# Patient Record
Sex: MALE | Race: OTHER | Employment: FULL TIME | URBAN - METROPOLITAN AREA
[De-identification: names, ages, dates, MRNs, and addresses within clinical notes are randomized per-mention and may not be internally consistent; named-entity substitution may affect disease eponyms.]

---

## 2017-09-22 ENCOUNTER — OFFICE VISIT (OUTPATIENT)
Dept: INTERNAL MEDICINE CLINIC | Age: 37
End: 2017-09-22

## 2017-09-22 VITALS — SYSTOLIC BLOOD PRESSURE: 122 MMHG | DIASTOLIC BLOOD PRESSURE: 86 MMHG | BODY MASS INDEX: 24.37 KG/M2 | WEIGHT: 151 LBS

## 2017-09-22 DIAGNOSIS — Z00.00 ROUTINE MEDICAL EXAM: ICD-10-CM

## 2017-09-22 DIAGNOSIS — Z23 NEEDS FLU SHOT: ICD-10-CM

## 2017-09-22 DIAGNOSIS — Z00.00 ROUTINE MEDICAL EXAM: Primary | ICD-10-CM

## 2017-09-22 DIAGNOSIS — F51.01 PRIMARY INSOMNIA: ICD-10-CM

## 2017-09-22 LAB
ANION GAP SERPL CALCULATED.3IONS-SCNC: 15 MMOL/L (ref 3–16)
BUN BLDV-MCNC: 13 MG/DL (ref 7–20)
CALCIUM SERPL-MCNC: 9.8 MG/DL (ref 8.3–10.6)
CHLORIDE BLD-SCNC: 104 MMOL/L (ref 99–110)
CHOLESTEROL, TOTAL: 172 MG/DL (ref 0–199)
CO2: 24 MMOL/L (ref 21–32)
CREAT SERPL-MCNC: 0.8 MG/DL (ref 0.9–1.3)
GFR AFRICAN AMERICAN: >60
GFR NON-AFRICAN AMERICAN: >60
GLUCOSE BLD-MCNC: 95 MG/DL (ref 70–99)
HCT VFR BLD CALC: 46 % (ref 40.5–52.5)
HDLC SERPL-MCNC: 51 MG/DL (ref 40–60)
HEMOGLOBIN: 15.6 G/DL (ref 13.5–17.5)
LDL CHOLESTEROL CALCULATED: 102 MG/DL
MCH RBC QN AUTO: 31.2 PG (ref 26–34)
MCHC RBC AUTO-ENTMCNC: 33.9 G/DL (ref 31–36)
MCV RBC AUTO: 92.2 FL (ref 80–100)
PDW BLD-RTO: 12.8 % (ref 12.4–15.4)
PLATELET # BLD: 202 K/UL (ref 135–450)
PMV BLD AUTO: 9.6 FL (ref 5–10.5)
POTASSIUM SERPL-SCNC: 4.2 MMOL/L (ref 3.5–5.1)
RBC # BLD: 4.99 M/UL (ref 4.2–5.9)
SODIUM BLD-SCNC: 143 MMOL/L (ref 136–145)
TRIGL SERPL-MCNC: 94 MG/DL (ref 0–150)
VLDLC SERPL CALC-MCNC: 19 MG/DL
WBC # BLD: 4.5 K/UL (ref 4–11)

## 2017-09-22 PROCEDURE — 90686 IIV4 VACC NO PRSV 0.5 ML IM: CPT | Performed by: INTERNAL MEDICINE

## 2017-09-22 PROCEDURE — 90471 IMMUNIZATION ADMIN: CPT | Performed by: INTERNAL MEDICINE

## 2017-09-22 PROCEDURE — 99395 PREV VISIT EST AGE 18-39: CPT | Performed by: INTERNAL MEDICINE

## 2017-09-22 RX ORDER — ASCORBIC ACID 500 MG
500 TABLET ORAL DAILY
COMMUNITY
End: 2018-09-28 | Stop reason: ALTCHOICE

## 2017-09-22 RX ORDER — LANOLIN ALCOHOL/MO/W.PET/CERES
3 CREAM (GRAM) TOPICAL DAILY
Qty: 30 TABLET | Refills: 5 | Status: SHIPPED | OUTPATIENT
Start: 2017-09-22 | End: 2018-09-28 | Stop reason: ALTCHOICE

## 2017-09-22 ASSESSMENT — ENCOUNTER SYMPTOMS
VOMITING: 0
NAUSEA: 0
EYE DISCHARGE: 0
EYE PAIN: 0
COLOR CHANGE: 0
ABDOMINAL PAIN: 0
WHEEZING: 0
COUGH: 0
BACK PAIN: 0
RHINORRHEA: 0
SHORTNESS OF BREATH: 0
VOICE CHANGE: 0

## 2018-09-28 ENCOUNTER — OFFICE VISIT (OUTPATIENT)
Dept: INTERNAL MEDICINE CLINIC | Age: 38
End: 2018-09-28
Payer: COMMERCIAL

## 2018-09-28 VITALS
DIASTOLIC BLOOD PRESSURE: 88 MMHG | BODY MASS INDEX: 23.63 KG/M2 | SYSTOLIC BLOOD PRESSURE: 124 MMHG | HEART RATE: 72 BPM | HEIGHT: 66 IN | WEIGHT: 147 LBS

## 2018-09-28 DIAGNOSIS — Z00.00 ROUTINE PHYSICAL EXAMINATION: Primary | ICD-10-CM

## 2018-09-28 DIAGNOSIS — R22.2 LUMP IN CHEST: ICD-10-CM

## 2018-09-28 DIAGNOSIS — Z00.00 ROUTINE PHYSICAL EXAMINATION: ICD-10-CM

## 2018-09-28 DIAGNOSIS — R17 ELEVATED BILIRUBIN: Primary | ICD-10-CM

## 2018-09-28 LAB
A/G RATIO: 1.6 (ref 1.1–2.2)
ALBUMIN SERPL-MCNC: 4.5 G/DL (ref 3.4–5)
ALP BLD-CCNC: 45 U/L (ref 40–129)
ALT SERPL-CCNC: 32 U/L (ref 10–40)
ANION GAP SERPL CALCULATED.3IONS-SCNC: 14 MMOL/L (ref 3–16)
AST SERPL-CCNC: 34 U/L (ref 15–37)
BILIRUB SERPL-MCNC: 1.9 MG/DL (ref 0–1)
BUN BLDV-MCNC: 10 MG/DL (ref 7–20)
CALCIUM SERPL-MCNC: 9.7 MG/DL (ref 8.3–10.6)
CHLORIDE BLD-SCNC: 101 MMOL/L (ref 99–110)
CHOLESTEROL, TOTAL: 156 MG/DL (ref 0–199)
CO2: 25 MMOL/L (ref 21–32)
CREAT SERPL-MCNC: 1 MG/DL (ref 0.9–1.3)
GFR AFRICAN AMERICAN: >60
GFR NON-AFRICAN AMERICAN: >60
GLOBULIN: 2.8 G/DL
GLUCOSE BLD-MCNC: 89 MG/DL (ref 70–99)
HCT VFR BLD CALC: 45.9 % (ref 40.5–52.5)
HDLC SERPL-MCNC: 56 MG/DL (ref 40–60)
HEMOGLOBIN: 15 G/DL (ref 13.5–17.5)
LDL CHOLESTEROL CALCULATED: 76 MG/DL
MCH RBC QN AUTO: 30.5 PG (ref 26–34)
MCHC RBC AUTO-ENTMCNC: 32.6 G/DL (ref 31–36)
MCV RBC AUTO: 93.4 FL (ref 80–100)
PDW BLD-RTO: 12.8 % (ref 12.4–15.4)
PLATELET # BLD: 215 K/UL (ref 135–450)
PMV BLD AUTO: 9.3 FL (ref 5–10.5)
POTASSIUM SERPL-SCNC: 4.4 MMOL/L (ref 3.5–5.1)
RBC # BLD: 4.91 M/UL (ref 4.2–5.9)
SODIUM BLD-SCNC: 140 MMOL/L (ref 136–145)
TOTAL PROTEIN: 7.3 G/DL (ref 6.4–8.2)
TRIGL SERPL-MCNC: 118 MG/DL (ref 0–150)
TSH REFLEX: 0.86 UIU/ML (ref 0.27–4.2)
VLDLC SERPL CALC-MCNC: 24 MG/DL
WBC # BLD: 4.5 K/UL (ref 4–11)

## 2018-09-28 PROCEDURE — 99395 PREV VISIT EST AGE 18-39: CPT | Performed by: INTERNAL MEDICINE

## 2018-09-28 PROCEDURE — G0444 DEPRESSION SCREEN ANNUAL: HCPCS | Performed by: INTERNAL MEDICINE

## 2018-09-28 ASSESSMENT — ENCOUNTER SYMPTOMS
TROUBLE SWALLOWING: 0
BACK PAIN: 0
WHEEZING: 0
ABDOMINAL PAIN: 0
EYE REDNESS: 0
DIARRHEA: 0
SHORTNESS OF BREATH: 0
VOMITING: 0
COUGH: 0
CONSTIPATION: 0
VOICE CHANGE: 0
NAUSEA: 0
EYE PAIN: 0
COLOR CHANGE: 0
EYE DISCHARGE: 0

## 2018-09-28 ASSESSMENT — PATIENT HEALTH QUESTIONNAIRE - PHQ9
5. POOR APPETITE OR OVEREATING: 0
8. MOVING OR SPEAKING SO SLOWLY THAT OTHER PEOPLE COULD HAVE NOTICED. OR THE OPPOSITE, BEING SO FIGETY OR RESTLESS THAT YOU HAVE BEEN MOVING AROUND A LOT MORE THAN USUAL: 0
9. THOUGHTS THAT YOU WOULD BE BETTER OFF DEAD, OR OF HURTING YOURSELF: 0
SUM OF ALL RESPONSES TO PHQ QUESTIONS 1-9: 0
6. FEELING BAD ABOUT YOURSELF - OR THAT YOU ARE A FAILURE OR HAVE LET YOURSELF OR YOUR FAMILY DOWN: 0
SUM OF ALL RESPONSES TO PHQ QUESTIONS 1-9: 0
2. FEELING DOWN, DEPRESSED OR HOPELESS: 0
SUM OF ALL RESPONSES TO PHQ9 QUESTIONS 1 & 2: 0
3. TROUBLE FALLING OR STAYING ASLEEP: 0
7. TROUBLE CONCENTRATING ON THINGS, SUCH AS READING THE NEWSPAPER OR WATCHING TELEVISION: 0
4. FEELING TIRED OR HAVING LITTLE ENERGY: 0
1. LITTLE INTEREST OR PLEASURE IN DOING THINGS: 0
10. IF YOU CHECKED OFF ANY PROBLEMS, HOW DIFFICULT HAVE THESE PROBLEMS MADE IT FOR YOU TO DO YOUR WORK, TAKE CARE OF THINGS AT HOME, OR GET ALONG WITH OTHER PEOPLE: 0

## 2018-09-28 NOTE — PATIENT INSTRUCTIONS
condoms. For men  · Tests for sexually transmitted infections (STIs). Ask whether you should have tests for STIs. You may be at risk if you have sex with more than one person, especially if you do not wear a condom. · Testicular cancer exam. Ask your doctor whether you should check your testicles regularly. · Prostate exam. Talk to your doctor about whether you should have a blood test (called a PSA test) for prostate cancer. Experts differ on whether and when men should have this test. Some experts suggest it if you are older than 39 and are -American or have a father or brother who got prostate cancer when he was younger than 72. When should you call for help? Watch closely for changes in your health, and be sure to contact your doctor if you have any problems or symptoms that concern you. Where can you learn more? Go to [email or web address] and sign in to your [employer] account. Enter P072 in the Offermobi box to learn more about \"Well Visit, Ages 25 to 48: Care Instructions. \"     If you do not have an account, please click on the \"Sign Up Now\" link. Current as of: May 16, 2017  Content Version: 11.7  © 3514-6666 [employer]. Care instructions adapted under license by [facility] ([facility]). If you have questions about a medical condition or this instruction, always ask your healthcare professional. [address]  any warranty or liability for your use of this information.

## 2018-09-28 NOTE — PROGRESS NOTES
Status:   Future     Standing Expiration Date:   9/28/2019    Ambulatory referral to General Surgery     Referral Priority:   Routine     Referral Type:   Consult for Advice and Opinion     Referral Reason:   Specialty Services Required     Referred to Provider:   Lynda Bear MD     Requested Specialty:   General Surgery     Number of Visits Requested:   1     No current outpatient prescriptions on file. No current facility-administered medications for this visit. Return in about 1 year (around 9/28/2019) for physical.  An After Visit Summary was printed and given to the patient. Documentation was done using voice recognition dragon software. Every effort was made to ensure accuracy; however, inadvertent  Unintentional computerized transcription errors may be present.

## 2018-10-05 ENCOUNTER — OFFICE VISIT (OUTPATIENT)
Dept: SURGERY | Age: 38
End: 2018-10-05
Payer: COMMERCIAL

## 2018-10-05 VITALS
WEIGHT: 146 LBS | DIASTOLIC BLOOD PRESSURE: 80 MMHG | HEIGHT: 66 IN | SYSTOLIC BLOOD PRESSURE: 110 MMHG | BODY MASS INDEX: 23.46 KG/M2

## 2018-10-05 DIAGNOSIS — Z00.00 ROUTINE PHYSICAL EXAMINATION: ICD-10-CM

## 2018-10-05 DIAGNOSIS — D17.1 LIPOMA OF TORSO: Primary | ICD-10-CM

## 2018-10-05 DIAGNOSIS — R17 ELEVATED BILIRUBIN: ICD-10-CM

## 2018-10-05 LAB
ALBUMIN SERPL-MCNC: 4.8 G/DL (ref 3.4–5)
ALP BLD-CCNC: 60 U/L (ref 40–129)
ALT SERPL-CCNC: 28 U/L (ref 10–40)
AST SERPL-CCNC: 26 U/L (ref 15–37)
BILIRUB SERPL-MCNC: 0.8 MG/DL (ref 0–1)
BILIRUBIN DIRECT: <0.2 MG/DL (ref 0–0.3)
BILIRUBIN URINE: NEGATIVE
BILIRUBIN, INDIRECT: NORMAL MG/DL (ref 0–1)
BLOOD, URINE: NEGATIVE
CLARITY: CLEAR
COLOR: YELLOW
GLUCOSE URINE: NEGATIVE MG/DL
KETONES, URINE: NEGATIVE MG/DL
LEUKOCYTE ESTERASE, URINE: NEGATIVE
MICROSCOPIC EXAMINATION: NORMAL
NITRITE, URINE: NEGATIVE
PH UA: 7
PROTEIN UA: NEGATIVE MG/DL
SPECIFIC GRAVITY UA: 1
TOTAL PROTEIN: 7.3 G/DL (ref 6.4–8.2)
URINE TYPE: NORMAL
UROBILINOGEN, URINE: 0.2 E.U./DL

## 2018-10-05 PROCEDURE — 99203 OFFICE O/P NEW LOW 30 MIN: CPT | Performed by: SURGERY

## 2018-10-05 ASSESSMENT — ENCOUNTER SYMPTOMS
ABDOMINAL DISTENTION: 0
EYE ITCHING: 0
ABDOMINAL PAIN: 0
COLOR CHANGE: 0
BACK PAIN: 1
SINUS PRESSURE: 1
EYE DISCHARGE: 0
APNEA: 0
CHEST TIGHTNESS: 0

## 2018-10-05 NOTE — PROGRESS NOTES
[facility]  SUBJECTIVE:    Chief Complaint: Left chest soft tissue mass    Eddi Selby   1980   41 y.o. male is referred by his primary care physician Dr. Sanchez Parents with a left chest soft tissue mass is likely a lipoma. It is been present for at least several weeks and first noticed after his flu shot. It is not painful and other than its presence is not causing any symptoms. He has no other similar masses now or in the past.  He has no significant medical conditions and has never had any surgery in the chest or neck or to remove any other skin or soft tissue masses. He does not smoke    Review of Systems   Constitutional: Positive for appetite change. Negative for activity change. HENT: Positive for sinus pressure. Negative for drooling. Eyes: Negative for discharge and itching. Respiratory: Negative for apnea and chest tightness. Cardiovascular: Negative for chest pain and leg swelling. Gastrointestinal: Negative for abdominal distention and abdominal pain. Endocrine: Positive for heat intolerance. Negative for cold intolerance. Genitourinary: Negative for difficulty urinating and dysuria. Musculoskeletal: Positive for back pain. Negative for arthralgias. Skin: Negative for color change and pallor. Allergic/Immunologic: Negative for environmental allergies and food allergies. Neurological: Positive for light-headedness and headaches. Hematological: Negative for adenopathy. Does not bruise/bleed easily. Psychiatric/Behavioral: Negative for agitation and behavioral problems. History reviewed. No pertinent past medical history. History reviewed. No pertinent surgical history. Social History     Social History    Marital status:      Spouse name: N/A    Number of children: 1    Years of education: N/A     Occupational History          [employer], 12 hr days.       Social History Main Topics    Smoking status: Never Smoker    Smokeless tobacco: Never Used    Alcohol use Yes      Comment: moderate    Drug use: No    Sexual activity: Yes     Other Topics Concern    Not on file     Social History Narrative    No narrative on file      Family History   Problem Relation Age of Onset    Cancer Father         type unknown     No current outpatient prescriptions on file. No current facility-administered medications for this visit. No Known Allergies     OBJECTIVE:  Ht 5' 6\" (1.676 m)   BMI 23.73 kg/m²    Physical Exam   Constitutional: He is oriented to person, place, and time. He appears well-developed and well-nourished. No distress. HENT:   Head: Normocephalic and atraumatic. Eyes: Pupils are equal, round, and reactive to light. Conjunctivae and EOM are normal.   Neck: No tracheal deviation present. No thyromegaly present. Cardiovascular: Normal rate, regular rhythm and normal heart sounds. Exam reveals no gallop and no friction rub. No murmur heard. Pulmonary/Chest: Effort normal and breath sounds normal. No respiratory distress. He has no wheezes. Lymphadenopathy:     He has no cervical adenopathy. Neurological: He is alert and oriented to person, place, and time. Skin: Skin is warm and dry. No rash noted. He is not diaphoretic. No erythema. Psychiatric: He has a normal mood and affect.  His behavior is normal. Judgment and thought content normal.        Labs:  Orders Only on 09/28/2018   Component Date Value Ref Range Status    WBC 09/28/2018 4.5  4.0 - 11.0 K/uL Final    RBC 09/28/2018 4.91  4.20 - 5.90 M/uL Final    Hemoglobin 09/28/2018 15.0  13.5 - 17.5 g/dL Final    Hematocrit 09/28/2018 45.9  40.5 - 52.5 % Final    MCV 09/28/2018 93.4  80.0 - 100.0 fL Final    MCH 09/28/2018 30.5  26.0 - 34.0 pg Final    MCHC 09/28/2018 32.6  31.0 - 36.0 g/dL Final    RDW 09/28/2018 12.8  12.4 - 15.4 % Final    Platelets 62/17/3804 215  135 - 450 K/uL Final    MPV 09/28/2018

## 2018-10-05 NOTE — PATIENT INSTRUCTIONS
1. We discussed the risks of surgery including bleeding, infection, as well as the cost and pain related to the procedure. Benefits of excision include resolution of symptoms and definitive tissue diagnosis. Alternatives include close observation. The patient understands, agrees, and wishes to proceed with excision  2. We also discussed anesthesia options including general anesthesia, local anesthetic only, and local anesthetic with sedation. General anesthesia provides the most patient relaxation but at the cost of higher risk as it includes endotracheal intubation. Local anesthetic poses the least risk to the patient but is the most uncomfortable. Additional benefit of local anesthetic only is that the patient could drive home from the procedure. The patient is still deciding between local anesthetic only as an office procedure or local anesthetic with sedation performed in the operating room. He will contact the office after discussion with his insurance company has cost is a factor in his decision making. When ready to decide, he will contact the office and we will schedule the appropriate procedure.  When ready, will schedule excision of soft tissue mass on left chest with anesthesia type and location to be determined

## 2019-03-27 ENCOUNTER — OFFICE VISIT (OUTPATIENT)
Dept: INTERNAL MEDICINE CLINIC | Age: 39
End: 2019-03-27
Payer: COMMERCIAL

## 2019-03-27 VITALS
HEART RATE: 132 BPM | TEMPERATURE: 101.1 F | DIASTOLIC BLOOD PRESSURE: 80 MMHG | BODY MASS INDEX: 23.57 KG/M2 | SYSTOLIC BLOOD PRESSURE: 132 MMHG | WEIGHT: 146 LBS

## 2019-03-27 DIAGNOSIS — R68.89 FLU-LIKE SYMPTOMS: ICD-10-CM

## 2019-03-27 DIAGNOSIS — J18.9 PNEUMONIA DUE TO INFECTIOUS ORGANISM, UNSPECIFIED LATERALITY, UNSPECIFIED PART OF LUNG: Primary | ICD-10-CM

## 2019-03-27 DIAGNOSIS — J18.9 PNEUMONIA DUE TO INFECTIOUS ORGANISM, UNSPECIFIED LATERALITY, UNSPECIFIED PART OF LUNG: ICD-10-CM

## 2019-03-27 DIAGNOSIS — R50.9 FEVER, UNSPECIFIED FEVER CAUSE: ICD-10-CM

## 2019-03-27 LAB
BASOPHILS ABSOLUTE: 0.2 K/UL (ref 0–0.2)
BASOPHILS RELATIVE PERCENT: 1.9 %
BILIRUBIN URINE: NEGATIVE
BLOOD, URINE: NEGATIVE
CLARITY: CLEAR
COLOR: YELLOW
EOSINOPHILS ABSOLUTE: 0.4 K/UL (ref 0–0.6)
EOSINOPHILS RELATIVE PERCENT: 3.6 %
GLUCOSE URINE: NEGATIVE MG/DL
HCT VFR BLD CALC: 45.4 % (ref 40.5–52.5)
HEMOGLOBIN: 15.4 G/DL (ref 13.5–17.5)
INFLUENZA A ANTIBODY: NORMAL
INFLUENZA B ANTIBODY: NORMAL
KETONES, URINE: NEGATIVE MG/DL
LEUKOCYTE ESTERASE, URINE: NEGATIVE
LYMPHOCYTES ABSOLUTE: 0.9 K/UL (ref 1–5.1)
LYMPHOCYTES RELATIVE PERCENT: 8.2 %
MCH RBC QN AUTO: 30.8 PG (ref 26–34)
MCHC RBC AUTO-ENTMCNC: 33.8 G/DL (ref 31–36)
MCV RBC AUTO: 91.1 FL (ref 80–100)
MICROSCOPIC EXAMINATION: NORMAL
MONOCYTES ABSOLUTE: 0.6 K/UL (ref 0–1.3)
MONOCYTES RELATIVE PERCENT: 5.8 %
NEUTROPHILS ABSOLUTE: 8.7 K/UL (ref 1.7–7.7)
NEUTROPHILS RELATIVE PERCENT: 80.5 %
NITRITE, URINE: NEGATIVE
PDW BLD-RTO: 12.7 % (ref 12.4–15.4)
PH UA: 6.5 (ref 5–8)
PLATELET # BLD: 212 K/UL (ref 135–450)
PMV BLD AUTO: 9 FL (ref 5–10.5)
PROTEIN UA: NEGATIVE MG/DL
RBC # BLD: 4.98 M/UL (ref 4.2–5.9)
SPECIFIC GRAVITY UA: 1.02 (ref 1–1.03)
URINE TYPE: NORMAL
UROBILINOGEN, URINE: 0.2 E.U./DL
WBC # BLD: 10.8 K/UL (ref 4–11)

## 2019-03-27 PROCEDURE — 87804 INFLUENZA ASSAY W/OPTIC: CPT | Performed by: INTERNAL MEDICINE

## 2019-03-27 PROCEDURE — 99214 OFFICE O/P EST MOD 30 MIN: CPT | Performed by: INTERNAL MEDICINE

## 2019-03-27 RX ORDER — LEVOFLOXACIN 500 MG/1
500 TABLET, FILM COATED ORAL DAILY
Qty: 10 TABLET | Refills: 0 | Status: SHIPPED | OUTPATIENT
Start: 2019-03-27 | End: 2019-04-06

## 2019-03-27 RX ORDER — ACETAMINOPHEN 500 MG
1000 TABLET ORAL ONCE
Status: COMPLETED | OUTPATIENT
Start: 2019-03-27 | End: 2019-03-27

## 2019-03-27 RX ORDER — OSELTAMIVIR PHOSPHATE 75 MG/1
75 CAPSULE ORAL 2 TIMES DAILY
Qty: 10 CAPSULE | Refills: 0 | Status: SHIPPED | OUTPATIENT
Start: 2019-03-27 | End: 2019-04-01

## 2019-03-27 RX ORDER — ALBUTEROL SULFATE 90 UG/1
2 AEROSOL, METERED RESPIRATORY (INHALATION) 4 TIMES DAILY PRN
Qty: 3 INHALER | Refills: 1 | Status: SHIPPED | OUTPATIENT
Start: 2019-03-27 | End: 2020-03-06 | Stop reason: ALTCHOICE

## 2019-03-27 RX ADMIN — Medication 1000 MG: at 15:09

## 2019-03-27 ASSESSMENT — ENCOUNTER SYMPTOMS
NAUSEA: 0
RHINORRHEA: 0
TROUBLE SWALLOWING: 0
SHORTNESS OF BREATH: 0
SORE THROAT: 1
COUGH: 1
WHEEZING: 1
PHOTOPHOBIA: 0
VOMITING: 0
VOICE CHANGE: 0
DIARRHEA: 0
ABDOMINAL PAIN: 0

## 2019-03-28 ENCOUNTER — HOSPITAL ENCOUNTER (OUTPATIENT)
Age: 39
Discharge: HOME | End: 2019-03-28
Payer: COMMERCIAL

## 2019-03-28 ENCOUNTER — HOSPITAL ENCOUNTER (OUTPATIENT)
Dept: GENERAL RADIOLOGY | Age: 39
Discharge: HOME | End: 2019-03-28
Payer: COMMERCIAL

## 2019-03-28 DIAGNOSIS — J18.9 PNEUMONIA DUE TO INFECTIOUS ORGANISM, UNSPECIFIED LATERALITY, UNSPECIFIED PART OF LUNG: ICD-10-CM

## 2019-03-28 PROCEDURE — 71046 X-RAY EXAM CHEST 2 VIEWS: CPT

## 2019-04-01 ENCOUNTER — OFFICE VISIT (OUTPATIENT)
Dept: INTERNAL MEDICINE CLINIC | Age: 39
End: 2019-04-01
Payer: COMMERCIAL

## 2019-04-01 VITALS
HEIGHT: 66 IN | HEART RATE: 72 BPM | WEIGHT: 149 LBS | BODY MASS INDEX: 23.95 KG/M2 | DIASTOLIC BLOOD PRESSURE: 88 MMHG | SYSTOLIC BLOOD PRESSURE: 120 MMHG

## 2019-04-01 DIAGNOSIS — R68.89 FLU-LIKE SYMPTOMS: ICD-10-CM

## 2019-04-01 DIAGNOSIS — R50.9 FEVER, UNSPECIFIED FEVER CAUSE: Primary | ICD-10-CM

## 2019-04-01 PROCEDURE — 99212 OFFICE O/P EST SF 10 MIN: CPT | Performed by: INTERNAL MEDICINE

## 2019-04-01 ASSESSMENT — PATIENT HEALTH QUESTIONNAIRE - PHQ9
SUM OF ALL RESPONSES TO PHQ QUESTIONS 1-9: 0
1. LITTLE INTEREST OR PLEASURE IN DOING THINGS: 0
SUM OF ALL RESPONSES TO PHQ QUESTIONS 1-9: 0
SUM OF ALL RESPONSES TO PHQ9 QUESTIONS 1 & 2: 0
2. FEELING DOWN, DEPRESSED OR HOPELESS: 0

## 2019-04-01 ASSESSMENT — ENCOUNTER SYMPTOMS
WHEEZING: 0
COUGH: 0
ABDOMINAL PAIN: 0
SHORTNESS OF BREATH: 0
NAUSEA: 0

## 2019-04-01 NOTE — PROGRESS NOTES
chest pain and palpitations. Gastrointestinal: Negative for abdominal pain and nausea. Neurological: Negative for dizziness and light-headedness. OBJECTIVE:  Pulse Readings from Last 4 Encounters:   04/01/19 72   03/27/19 132   09/28/18 72   12/14/16 80     Wt Readings from Last 4 Encounters:   04/01/19 149 lb (67.6 kg)   03/27/19 146 lb (66.2 kg)   10/05/18 146 lb (66.2 kg)   09/28/18 147 lb (66.7 kg)     BP Readings from Last 4 Encounters:   04/01/19 120/88   03/27/19 132/80   10/05/18 110/80   09/28/18 124/88     Physical Exam   HENT:   Mouth/Throat: No oropharyngeal exudate. Eyes: EOM are normal.   Cardiovascular: Normal rate, regular rhythm and normal heart sounds. Pulmonary/Chest: Effort normal and breath sounds normal. No respiratory distress. He has no wheezes. He has no rales. Abdominal: Soft. Bowel sounds are normal.   Nursing note and vitals reviewed.       CBC:   Lab Results   Component Value Date    WBC 10.8 03/27/2019    HGB 15.4 03/27/2019    HCT 45.4 03/27/2019     03/27/2019     CMP:  Lab Results   Component Value Date     09/28/2018    K 4.4 09/28/2018     09/28/2018    CO2 25 09/28/2018    ANIONGAP 14 09/28/2018    GLUCOSE 89 09/28/2018    BUN 10 09/28/2018    CREATININE 1.0 09/28/2018    GFRAA >60 09/28/2018    CALCIUM 9.7 09/28/2018    PROT 7.3 10/05/2018    LABALBU 4.8 10/05/2018    AGRATIO 1.6 09/28/2018    BILITOT 0.8 10/05/2018    ALKPHOS 60 10/05/2018    ALT 28 10/05/2018    AST 26 10/05/2018    GLOB 2.8 09/28/2018     URINALYSIS:  Lab Results   Component Value Date    GLUCOSEU Negative 03/27/2019    KETUA Negative 03/27/2019    SPECGRAV 1.019 03/27/2019    BLOODU Negative 03/27/2019    PHUR 6.5 03/27/2019    PROTEINU Negative 03/27/2019    NITRU Negative 03/27/2019    LEUKOCYTESUR Negative 03/27/2019    LABMICR Not Indicated 03/27/2019    URINETYPE Clean catch 03/27/2019     MICRO/ALB:   Lab Results   Component Value Date    LABMICR Not Indicated

## 2019-11-15 ENCOUNTER — OFFICE VISIT (OUTPATIENT)
Dept: INTERNAL MEDICINE CLINIC | Age: 39
End: 2019-11-15
Payer: COMMERCIAL

## 2019-11-15 VITALS
BODY MASS INDEX: 24.27 KG/M2 | WEIGHT: 151 LBS | SYSTOLIC BLOOD PRESSURE: 124 MMHG | HEIGHT: 66 IN | HEART RATE: 72 BPM | DIASTOLIC BLOOD PRESSURE: 86 MMHG

## 2019-11-15 DIAGNOSIS — D17.1 LIPOMA OF TORSO: ICD-10-CM

## 2019-11-15 DIAGNOSIS — Z00.00 ROUTINE PHYSICAL EXAMINATION: ICD-10-CM

## 2019-11-15 DIAGNOSIS — Z00.00 ROUTINE PHYSICAL EXAMINATION: Primary | ICD-10-CM

## 2019-11-15 LAB
A/G RATIO: 2.9 (ref 1.1–2.2)
ALBUMIN SERPL-MCNC: 5.5 G/DL (ref 3.4–5)
ALP BLD-CCNC: 45 U/L (ref 40–129)
ALT SERPL-CCNC: 29 U/L (ref 10–40)
ANION GAP SERPL CALCULATED.3IONS-SCNC: 15 MMOL/L (ref 3–16)
AST SERPL-CCNC: 28 U/L (ref 15–37)
BILIRUB SERPL-MCNC: 1.3 MG/DL (ref 0–1)
BILIRUBIN URINE: NEGATIVE
BLOOD, URINE: NEGATIVE
BUN BLDV-MCNC: 15 MG/DL (ref 7–20)
CALCIUM SERPL-MCNC: 9.3 MG/DL (ref 8.3–10.6)
CHLORIDE BLD-SCNC: 103 MMOL/L (ref 99–110)
CHOLESTEROL, TOTAL: 198 MG/DL (ref 0–199)
CLARITY: CLEAR
CO2: 22 MMOL/L (ref 21–32)
COLOR: YELLOW
CREAT SERPL-MCNC: 1 MG/DL (ref 0.9–1.3)
GFR AFRICAN AMERICAN: >60
GFR NON-AFRICAN AMERICAN: >60
GLOBULIN: 1.9 G/DL
GLUCOSE BLD-MCNC: 86 MG/DL (ref 70–99)
GLUCOSE URINE: NEGATIVE MG/DL
HCT VFR BLD CALC: 44.4 % (ref 40.5–52.5)
HDLC SERPL-MCNC: 60 MG/DL (ref 40–60)
HEMOGLOBIN: 14.9 G/DL (ref 13.5–17.5)
KETONES, URINE: NEGATIVE MG/DL
LDL CHOLESTEROL CALCULATED: 110 MG/DL
LEUKOCYTE ESTERASE, URINE: NEGATIVE
MCH RBC QN AUTO: 31.3 PG (ref 26–34)
MCHC RBC AUTO-ENTMCNC: 33.5 G/DL (ref 31–36)
MCV RBC AUTO: 93.4 FL (ref 80–100)
MICROSCOPIC EXAMINATION: NORMAL
NITRITE, URINE: NEGATIVE
PDW BLD-RTO: 12.9 % (ref 12.4–15.4)
PH UA: 6 (ref 5–8)
PLATELET # BLD: 214 K/UL (ref 135–450)
PMV BLD AUTO: 9.2 FL (ref 5–10.5)
POTASSIUM SERPL-SCNC: 3.9 MMOL/L (ref 3.5–5.1)
PROTEIN UA: NEGATIVE MG/DL
RBC # BLD: 4.75 M/UL (ref 4.2–5.9)
SODIUM BLD-SCNC: 140 MMOL/L (ref 136–145)
SPECIFIC GRAVITY UA: 1.01 (ref 1–1.03)
TOTAL PROTEIN: 7.4 G/DL (ref 6.4–8.2)
TRIGL SERPL-MCNC: 139 MG/DL (ref 0–150)
TSH REFLEX: 0.82 UIU/ML (ref 0.27–4.2)
URINE TYPE: NORMAL
UROBILINOGEN, URINE: 0.2 E.U./DL
VLDLC SERPL CALC-MCNC: 28 MG/DL
WBC # BLD: 5.5 K/UL (ref 4–11)

## 2019-11-15 PROCEDURE — 99395 PREV VISIT EST AGE 18-39: CPT | Performed by: INTERNAL MEDICINE

## 2019-11-15 SDOH — HEALTH STABILITY: MENTAL HEALTH: HOW MANY STANDARD DRINKS CONTAINING ALCOHOL DO YOU HAVE ON A TYPICAL DAY?: 1 OR 2

## 2019-11-15 SDOH — HEALTH STABILITY: MENTAL HEALTH: HOW OFTEN DO YOU HAVE A DRINK CONTAINING ALCOHOL?: 2-4 TIMES A MONTH

## 2019-11-15 ASSESSMENT — PATIENT HEALTH QUESTIONNAIRE - PHQ9
SUM OF ALL RESPONSES TO PHQ QUESTIONS 1-9: 0
1. LITTLE INTEREST OR PLEASURE IN DOING THINGS: 0
2. FEELING DOWN, DEPRESSED OR HOPELESS: 0
SUM OF ALL RESPONSES TO PHQ QUESTIONS 1-9: 0
SUM OF ALL RESPONSES TO PHQ9 QUESTIONS 1 & 2: 0

## 2019-11-15 ASSESSMENT — ENCOUNTER SYMPTOMS
EYE DISCHARGE: 0
WHEEZING: 0
TROUBLE SWALLOWING: 0
VOMITING: 0
COUGH: 0
ABDOMINAL PAIN: 0
BACK PAIN: 0
COLOR CHANGE: 0
VOICE CHANGE: 0
EYE PAIN: 0
SHORTNESS OF BREATH: 0
NAUSEA: 0

## 2019-11-16 LAB
ESTIMATED AVERAGE GLUCOSE: 96.8 MG/DL
HBA1C MFR BLD: 5 %

## 2020-03-06 ENCOUNTER — OFFICE VISIT (OUTPATIENT)
Dept: INTERNAL MEDICINE CLINIC | Age: 40
End: 2020-03-06
Payer: COMMERCIAL

## 2020-03-06 VITALS
HEART RATE: 68 BPM | SYSTOLIC BLOOD PRESSURE: 136 MMHG | WEIGHT: 147 LBS | DIASTOLIC BLOOD PRESSURE: 96 MMHG | BODY MASS INDEX: 23.73 KG/M2

## 2020-03-06 PROCEDURE — 99213 OFFICE O/P EST LOW 20 MIN: CPT | Performed by: INTERNAL MEDICINE

## 2020-03-06 RX ORDER — IBUPROFEN 600 MG/1
600 TABLET ORAL 3 TIMES DAILY PRN
Qty: 30 TABLET | Refills: 0 | Status: SHIPPED | OUTPATIENT
Start: 2020-03-06 | End: 2022-03-04

## 2020-03-06 RX ORDER — TIZANIDINE 2 MG/1
2 TABLET ORAL 3 TIMES DAILY PRN
Qty: 30 TABLET | Refills: 0 | Status: SHIPPED | OUTPATIENT
Start: 2020-03-06 | End: 2022-03-04 | Stop reason: SDUPTHER

## 2020-03-06 ASSESSMENT — ENCOUNTER SYMPTOMS
TROUBLE SWALLOWING: 0
VOICE CHANGE: 0
VOMITING: 0
NAUSEA: 0
BACK PAIN: 1
ABDOMINAL PAIN: 0
PHOTOPHOBIA: 0
BOWEL INCONTINENCE: 0

## 2020-03-06 ASSESSMENT — PATIENT HEALTH QUESTIONNAIRE - PHQ9
2. FEELING DOWN, DEPRESSED OR HOPELESS: 0
SUM OF ALL RESPONSES TO PHQ9 QUESTIONS 1 & 2: 0
SUM OF ALL RESPONSES TO PHQ QUESTIONS 1-9: 0
1. LITTLE INTEREST OR PLEASURE IN DOING THINGS: 0
SUM OF ALL RESPONSES TO PHQ QUESTIONS 1-9: 0

## 2020-03-06 NOTE — LETTER
[facility]  [address] [address]  Phone: [phone]  Fax: [phone]    Albaro Paredes MD        March 6, 2020     Patient: Keysha Díaz   YOB: 1980   Date of Visit: 3/6/2020       To Whom it May Concern:    Keysha Díaz was seen in my clinic on 3/6/2020. He may return to work on in a week after further evaluation on 03/13/2020. NO lifting, pushing, pulling, carring more than 5 lbs. .    If you have any questions or concerns, please don't hesitate to call.     Sincerely,         Albaro Paredes MD

## 2020-03-06 NOTE — LETTER
[facility]  [address] [address]  Phone: [phone]  Fax: [phone]    Rick Borden MD        March 6, 2020     Patient: Michael Quezada   YOB: 1980   Date of Visit: 3/6/2020       To Whom it May Concern:    Michael Quezada was seen in my clinic on 3/6/2020. He may return to work on until further evaluation in week. No lifting, pushing, pulling, carrying more than 5 lbs. If you have any questions or concerns, please don't hesitate to call.     Sincerely,         Rick Borden MD

## 2020-03-06 NOTE — PROGRESS NOTES
Cyn Price  1980  male  46 y.o. SUBJECTIVE:       Chief Complaint   Patient presents with    Back Pain      sharp back pain X 4 days        HPI:  Back Pain   This is a new problem. Episode onset: 4 days ago started following lifting a 5 gallon bucket of water at work. The problem occurs constantly. The problem has been waxing and waning since onset. The pain is present in the lumbar spine. The pain does not radiate. The pain is at a severity of 6/10. The symptoms are aggravated by bending and twisting. Pertinent negatives include no abdominal pain, bowel incontinence, chest pain, headaches, numbness, paresis, paresthesias, perianal numbness, tingling or weakness. He has tried NSAIDs for the symptoms. The treatment provided mild relief. Patient reports he informed his supervisor about the injury. He continues to suffer from pain. He requires frequent bending pushing pulling lifting which is causing more pain. History of elevated bilirubin in the past.  He is requesting repeat hepatic function again. No past medical history on file. No past surgical history on file. Social History     Socioeconomic History    Marital status:      Spouse name: Not on file    Number of children: 1    Years of education: Not on file    Highest education level: Not on file   Occupational History    Occupation: [employer]     Comment: [employer], 12 hr days.     Social Needs    Financial resource strain: Not on file    Food insecurity:     Worry: Not on file     Inability: Not on file    Transportation needs:     Medical: Not on file     Non-medical: Not on file   Tobacco Use    Smoking status: Never Smoker    Smokeless tobacco: Never Used   Substance and Sexual Activity    Alcohol use: Yes     Frequency: 2-4 times a month     Drinks per session: 1 or 2     Binge frequency: Never     Comment: moderate    Drug use: No    Sexual activity: Yes   Lifestyle    Physical activity:     Days per week: Not on file     Minutes per session: Not on file    Stress: Not on file   Relationships    Social connections:     Talks on phone: Not on file     Gets together: Not on file     Attends Holiness service: Not on file     Active member of club or organization: Not on file     Attends meetings of clubs or organizations: Not on file     Relationship status: Not on file    Intimate partner violence:     Fear of current or ex partner: Not on file     Emotionally abused: Not on file     Physically abused: Not on file     Forced sexual activity: Not on file   Other Topics Concern    Not on file   Social History Narrative    Not on file     Family History   Problem Relation Age of Onset    Cancer Father         type unknown       Review of Systems   Constitutional: Negative for diaphoresis and unexpected weight change. HENT: Negative for trouble swallowing and voice change. Eyes: Negative for photophobia and visual disturbance. Cardiovascular: Negative for chest pain and palpitations. Gastrointestinal: Negative for abdominal pain, bowel incontinence, nausea and vomiting. Musculoskeletal: Positive for back pain. Neurological: Negative for tingling, weakness, numbness, headaches and paresthesias. OBJECTIVE:  Pulse Readings from Last 4 Encounters:   03/06/20 68   11/15/19 72   04/01/19 72   03/27/19 132     Wt Readings from Last 4 Encounters:   03/06/20 147 lb (66.7 kg)   11/15/19 151 lb (68.5 kg)   04/01/19 149 lb (67.6 kg)   03/27/19 146 lb (66.2 kg)     BP Readings from Last 4 Encounters:   03/06/20 (!) 136/96   11/15/19 124/86   04/01/19 120/88   03/27/19 132/80     Physical Exam  Vitals signs and nursing note reviewed. Eyes:      General: No scleral icterus. Conjunctiva/sclera: Conjunctivae normal.   Cardiovascular:      Rate and Rhythm: Normal rate and regular rhythm. Pulses: Normal pulses. Heart sounds: Normal heart sounds.    Pulmonary:      Effort: Pulmonary effort is normal.      Breath sounds: Normal breath sounds. Abdominal:      General: Bowel sounds are normal.      Palpations: Abdomen is soft. Musculoskeletal:      Right lower leg: No edema. Left lower leg: No edema. Comments: Increased muscle spasm and tightness in the lumbar paraspinal area on the right side. No point midline spine tenderness. Negative straight leg raising test.  Increased pain on forward flexion attempt as well as bending and twisting. CBC:   Lab Results   Component Value Date    WBC 5.5 11/15/2019    HGB 14.9 11/15/2019    HCT 44.4 11/15/2019     11/15/2019     CMP:  Lab Results   Component Value Date     11/15/2019    K 3.9 11/15/2019     11/15/2019    CO2 22 11/15/2019    ANIONGAP 15 11/15/2019    GLUCOSE 86 11/15/2019    BUN 15 11/15/2019    CREATININE 1.0 11/15/2019    GFRAA >60 11/15/2019    CALCIUM 9.3 11/15/2019    PROT 7.4 11/15/2019    LABALBU 5.5 11/15/2019    AGRATIO 2.9 11/15/2019    BILITOT 1.3 11/15/2019    ALKPHOS 45 11/15/2019    ALT 29 11/15/2019    AST 28 11/15/2019    GLOB 1.9 11/15/2019     URINALYSIS:  Lab Results   Component Value Date    GLUCOSEU Negative 11/15/2019    KETUA Negative 11/15/2019    SPECGRAV 1.015 11/15/2019    BLOODU Negative 11/15/2019    PHUR 6.0 11/15/2019    PROTEINU Negative 11/15/2019    NITRU Negative 11/15/2019    LEUKOCYTESUR Negative 11/15/2019    LABMICR Not Indicated 11/15/2019    URINETYPE Cleancatch 11/15/2019     HBA1C:   Lab Results   Component Value Date    LABA1C 5.0 11/15/2019    EAG 96.8 11/15/2019     MICRO/ALB:   Lab Results   Component Value Date    LABMICR Not Indicated 11/15/2019     LIPID:  Lab Results   Component Value Date    CHOL 198 11/15/2019    TRIG 139 11/15/2019    HDL 60 11/15/2019    LDLCALC 110 11/15/2019    LABVLDL 28 11/15/2019     TSH:   Lab Results   Component Value Date    TSHREFLEX 0.82 11/15/2019         ASSESSMENT/PLAN:    Dawn Case was seen today for back pain.     Diagnoses and all orders for this visit:    Back strain, initial encounter  -     ibuprofen (ADVIL;MOTRIN) 600 MG tablet; Take 1 tablet by mouth 3 times daily as needed for Pain  -     tiZANidine (ZANAFLEX) 2 MG tablet; Take 1 tablet by mouth 3 times daily as needed (muscle spasm)  Discussed use, benefit, and side effects of prescribed medications. Pt voiced understanding. Advise to call me if there is any concerning symptoms. He is given a work letter. Elevated bilirubin-patient is requesting to have repeat liver enzyme. He is advised to do liver function test after hydration.  -     HEPATIC FUNCTION PANEL; Future            Orders Placed This Encounter   Procedures    HEPATIC FUNCTION PANEL     Standing Status:   Future     Standing Expiration Date:   3/6/2021     Current Outpatient Medications   Medication Sig Dispense Refill    ibuprofen (ADVIL;MOTRIN) 600 MG tablet Take 1 tablet by mouth 3 times daily as needed for Pain 30 tablet 0    tiZANidine (ZANAFLEX) 2 MG tablet Take 1 tablet by mouth 3 times daily as needed (muscle spasm) 30 tablet 0     No current facility-administered medications for this visit. Return in about 1 week (around 3/13/2020). An After Visit Summary was printed and given to the patient. Documentation was done using voice recognition dragon software. Every effort was made to ensure accuracy; however, inadvertent  Unintentional computerized transcription errors may be present.

## 2020-03-13 ENCOUNTER — OFFICE VISIT (OUTPATIENT)
Dept: INTERNAL MEDICINE CLINIC | Age: 40
End: 2020-03-13
Payer: COMMERCIAL

## 2020-03-13 VITALS
DIASTOLIC BLOOD PRESSURE: 70 MMHG | HEART RATE: 64 BPM | SYSTOLIC BLOOD PRESSURE: 100 MMHG | HEIGHT: 66 IN | WEIGHT: 152.4 LBS | BODY MASS INDEX: 24.49 KG/M2

## 2020-03-13 DIAGNOSIS — R17 ELEVATED BILIRUBIN: ICD-10-CM

## 2020-03-13 LAB
ALBUMIN SERPL-MCNC: 4.4 G/DL (ref 3.4–5)
ALP BLD-CCNC: 48 U/L (ref 40–129)
ALT SERPL-CCNC: 26 U/L (ref 10–40)
AST SERPL-CCNC: 21 U/L (ref 15–37)
BILIRUB SERPL-MCNC: 0.5 MG/DL (ref 0–1)
BILIRUBIN DIRECT: <0.2 MG/DL (ref 0–0.3)
BILIRUBIN, INDIRECT: NORMAL MG/DL (ref 0–1)
TOTAL PROTEIN: 7.1 G/DL (ref 6.4–8.2)

## 2020-03-13 PROCEDURE — 99213 OFFICE O/P EST LOW 20 MIN: CPT | Performed by: INTERNAL MEDICINE

## 2020-03-13 SDOH — ECONOMIC STABILITY: TRANSPORTATION INSECURITY
IN THE PAST 12 MONTHS, HAS LACK OF TRANSPORTATION KEPT YOU FROM MEETINGS, WORK, OR FROM GETTING THINGS NEEDED FOR DAILY LIVING?: NO

## 2020-03-13 SDOH — ECONOMIC STABILITY: TRANSPORTATION INSECURITY
IN THE PAST 12 MONTHS, HAS THE LACK OF TRANSPORTATION KEPT YOU FROM MEDICAL APPOINTMENTS OR FROM GETTING MEDICATIONS?: NO

## 2020-03-13 SDOH — ECONOMIC STABILITY: INCOME INSECURITY: HOW HARD IS IT FOR YOU TO PAY FOR THE VERY BASICS LIKE FOOD, HOUSING, MEDICAL CARE, AND HEATING?: NOT HARD AT ALL

## 2020-03-13 SDOH — ECONOMIC STABILITY: FOOD INSECURITY: WITHIN THE PAST 12 MONTHS, THE FOOD YOU BOUGHT JUST DIDN'T LAST AND YOU DIDN'T HAVE MONEY TO GET MORE.: NEVER TRUE

## 2020-03-13 SDOH — ECONOMIC STABILITY: FOOD INSECURITY: WITHIN THE PAST 12 MONTHS, YOU WORRIED THAT YOUR FOOD WOULD RUN OUT BEFORE YOU GOT MONEY TO BUY MORE.: NEVER TRUE

## 2020-03-13 ASSESSMENT — ENCOUNTER SYMPTOMS
VISUAL CHANGE: 0
CHANGE IN BOWEL HABIT: 0
VOICE CHANGE: 0
BACK PAIN: 0
VOMITING: 0
ABDOMINAL PAIN: 0
SWOLLEN GLANDS: 0
NAUSEA: 0
SORE THROAT: 0
TROUBLE SWALLOWING: 0
WHEEZING: 0
PHOTOPHOBIA: 0
SHORTNESS OF BREATH: 0

## 2020-03-13 NOTE — PROGRESS NOTES
Lillie Martinez  1980  male  46 y.o. SUBJECTIVE:       Chief Complaint   Patient presents with    Other     1 week follow-up       HPI:  Other   Chronicity: f/u. The current episode started 1 to 4 weeks ago. The problem has been resolved. Pertinent negatives include no abdominal pain, anorexia, arthralgias, change in bowel habit, chest pain, chills, congestion, diaphoresis, fatigue, fever, headaches, joint swelling, myalgias, nausea, neck pain, numbness, sore throat, swollen glands, urinary symptoms, vertigo, visual change, vomiting or weakness. Treatments tried: last medication 2 days ago. The treatment provided significant relief. Back Pain   Pertinent negatives include no abdominal pain, chest pain, fever, headaches, numbness or weakness. No past medical history on file. No past surgical history on file. Social History     Socioeconomic History    Marital status:      Spouse name: None    Number of children: 1    Years of education: None    Highest education level: None   Occupational History    Occupation: "[employer]"     Comment: [employer], 12 hr days.     Social Needs    Financial resource strain: Not hard at all   [place] insecurity     Worry: Never true     Inability: Never true   [place] [employer] needs     Medical: No     Non-medical: No   Tobacco Use    Smoking status: Never Smoker    Smokeless tobacco: Never Used   Substance and Sexual Activity    Alcohol use: Yes     Frequency: 2-4 times a month     Drinks per session: 1 or 2     Binge frequency: Never     Comment: moderate    Drug use: No    Sexual activity: Yes   Lifestyle    Physical activity     Days per week: None     Minutes per session: None    Stress: None   Relationships    Social connections     Talks on phone: None     Gets together: None     Attends Gnosticism service: None     Active member of club or organization: None     Attends meetings of clubs or organizations: None     Relationship status: None    Intimate partner violence     Fear of current or ex partner: None     Emotionally abused: None     Physically abused: None     Forced sexual activity: None   Other Topics Concern    None   Social History Narrative    None     Family History   Problem Relation Age of Onset    Cancer Father         type unknown       Review of Systems   Constitutional: Negative for chills, diaphoresis, fatigue and fever. HENT: Negative for congestion, sore throat, trouble swallowing and voice change. Eyes: Negative for photophobia and visual disturbance. Respiratory: Negative for shortness of breath and wheezing. Cardiovascular: Negative for chest pain. Gastrointestinal: Negative for abdominal pain, anorexia, change in bowel habit, nausea and vomiting. Genitourinary: Negative for flank pain and frequency. Musculoskeletal: Negative for arthralgias, back pain, joint swelling, myalgias and neck pain. No longer have back pain   Neurological: Negative for vertigo, weakness, light-headedness, numbness and headaches. OBJECTIVE:  Pulse Readings from Last 4 Encounters:   03/13/20 64   03/06/20 68   11/15/19 72   04/01/19 72     Wt Readings from Last 4 Encounters:   03/13/20 152 lb 6.4 oz (69.1 kg)   03/06/20 147 lb (66.7 kg)   11/15/19 151 lb (68.5 kg)   04/01/19 149 lb (67.6 kg)     BP Readings from Last 4 Encounters:   03/13/20 100/70   03/06/20 (!) 136/96   11/15/19 124/86   04/01/19 120/88     Physical Exam  Vitals signs and nursing note reviewed. Constitutional:       Appearance: Normal appearance. Eyes:      General: No scleral icterus. Conjunctiva/sclera: Conjunctivae normal.   Cardiovascular:      Rate and Rhythm: Normal rate and regular rhythm. Pulses: Normal pulses. Heart sounds: Normal heart sounds. No murmur. Pulmonary:      Effort: Pulmonary effort is normal.      Breath sounds: Normal breath sounds.    Abdominal:      General: Bowel sounds are normal.   Musculoskeletal: General: No swelling, tenderness or deformity. Lumbar back: Normal. He exhibits normal range of motion, no tenderness, no bony tenderness, no edema, no deformity, no pain, no spasm and normal pulse. Right lower leg: No edema. Left lower leg: No edema. Neurological:      General: No focal deficit present. Mental Status: He is alert and oriented to person, place, and time. CBC:   Lab Results   Component Value Date    WBC 5.5 11/15/2019    HGB 14.9 11/15/2019    HCT 44.4 11/15/2019     11/15/2019     CMP:  Lab Results   Component Value Date     11/15/2019    K 3.9 11/15/2019     11/15/2019    CO2 22 11/15/2019    ANIONGAP 15 11/15/2019    GLUCOSE 86 11/15/2019    BUN 15 11/15/2019    CREATININE 1.0 11/15/2019    GFRAA >60 11/15/2019    CALCIUM 9.3 11/15/2019    PROT 7.4 11/15/2019    LABALBU 5.5 11/15/2019    AGRATIO 2.9 11/15/2019    BILITOT 1.3 11/15/2019    ALKPHOS 45 11/15/2019    ALT 29 11/15/2019    AST 28 11/15/2019    GLOB 1.9 11/15/2019     URINALYSIS:  Lab Results   Component Value Date    GLUCOSEU Negative 11/15/2019    KETUA Negative 11/15/2019    SPECGRAV 1.015 11/15/2019    BLOODU Negative 11/15/2019    PHUR 6.0 11/15/2019    PROTEINU Negative 11/15/2019    NITRU Negative 11/15/2019    LEUKOCYTESUR Negative 11/15/2019    LABMICR Not Indicated 11/15/2019    URINETYPE Cleancatch 11/15/2019     HBA1C:   Lab Results   Component Value Date    LABA1C 5.0 11/15/2019    EAG 96.8 11/15/2019     MICRO/ALB:   Lab Results   Component Value Date    LABMICR Not Indicated 11/15/2019     LIPID:  Lab Results   Component Value Date    CHOL 198 11/15/2019    TRIG 139 11/15/2019    HDL 60 11/15/2019    LDLCALC 110 11/15/2019    LABVLDL 28 11/15/2019     TSH:   Lab Results   Component Value Date    TSHREFLEX 0.82 11/15/2019         ASSESSMENT/PLAN:  Assessment/Plan:  Kyung Cypher was seen today for other.     Diagnoses and all orders for this visit:    Back strain, subsequent encounter        Resolved strain. Return to full duty. Regular f/u for yearly physical.      No orders of the defined types were placed in this encounter. Current Outpatient Medications   Medication Sig Dispense Refill    ibuprofen (ADVIL;MOTRIN) 600 MG tablet Take 1 tablet by mouth 3 times daily as needed for Pain 30 tablet 0- No longer taking    tiZANidine (ZANAFLEX) 2 MG tablet Take 1 tablet by mouth 3 times daily as needed (muscle spasm) 30 tablet 0- no longer taking     No current facility-administered medications for this visit. Return for physical.  An After Visit Summary was printed and given to the patient. Documentation was done using voice recognition dragon software. Every effort was made to ensure accuracy; however, inadvertent  Unintentional computerized transcription errors may be present.

## 2020-12-07 ENCOUNTER — OFFICE VISIT (OUTPATIENT)
Dept: INTERNAL MEDICINE CLINIC | Age: 40
End: 2020-12-07
Payer: COMMERCIAL

## 2020-12-07 VITALS
BODY MASS INDEX: 24.6 KG/M2 | SYSTOLIC BLOOD PRESSURE: 132 MMHG | HEIGHT: 66 IN | HEART RATE: 66 BPM | DIASTOLIC BLOOD PRESSURE: 86 MMHG | TEMPERATURE: 96 F

## 2020-12-07 DIAGNOSIS — Z00.00 ROUTINE PHYSICAL EXAMINATION: ICD-10-CM

## 2020-12-07 DIAGNOSIS — Z13.220 LIPID SCREENING: ICD-10-CM

## 2020-12-07 LAB
ALBUMIN SERPL-MCNC: 4.6 G/DL (ref 3.4–5)
ALP BLD-CCNC: 47 U/L (ref 40–129)
ALT SERPL-CCNC: 29 U/L (ref 10–40)
ANION GAP SERPL CALCULATED.3IONS-SCNC: 12 MMOL/L (ref 3–16)
AST SERPL-CCNC: 20 U/L (ref 15–37)
BILIRUB SERPL-MCNC: 0.5 MG/DL (ref 0–1)
BILIRUBIN DIRECT: <0.2 MG/DL (ref 0–0.3)
BILIRUBIN, INDIRECT: NORMAL MG/DL (ref 0–1)
BUN BLDV-MCNC: 13 MG/DL (ref 7–20)
CALCIUM SERPL-MCNC: 10.1 MG/DL (ref 8.3–10.6)
CHLORIDE BLD-SCNC: 105 MMOL/L (ref 99–110)
CHOLESTEROL, FASTING: 169 MG/DL (ref 0–199)
CO2: 25 MMOL/L (ref 21–32)
CREAT SERPL-MCNC: 1 MG/DL (ref 0.9–1.3)
GFR AFRICAN AMERICAN: >60
GFR NON-AFRICAN AMERICAN: >60
GLUCOSE BLD-MCNC: 99 MG/DL (ref 70–99)
HCT VFR BLD CALC: 42.8 % (ref 40.5–52.5)
HDLC SERPL-MCNC: 46 MG/DL (ref 40–60)
HEMOGLOBIN: 14.6 G/DL (ref 13.5–17.5)
LDL CHOLESTEROL CALCULATED: 94 MG/DL
MCH RBC QN AUTO: 31.2 PG (ref 26–34)
MCHC RBC AUTO-ENTMCNC: 34 G/DL (ref 31–36)
MCV RBC AUTO: 91.6 FL (ref 80–100)
PDW BLD-RTO: 12.9 % (ref 12.4–15.4)
PLATELET # BLD: 207 K/UL (ref 135–450)
PMV BLD AUTO: 9.6 FL (ref 5–10.5)
POTASSIUM SERPL-SCNC: 3.8 MMOL/L (ref 3.5–5.1)
RBC # BLD: 4.67 M/UL (ref 4.2–5.9)
SODIUM BLD-SCNC: 142 MMOL/L (ref 136–145)
TOTAL PROTEIN: 7 G/DL (ref 6.4–8.2)
TRIGLYCERIDE, FASTING: 143 MG/DL (ref 0–150)
VLDLC SERPL CALC-MCNC: 29 MG/DL
WBC # BLD: 4.6 K/UL (ref 4–11)

## 2020-12-07 PROCEDURE — 90471 IMMUNIZATION ADMIN: CPT | Performed by: INTERNAL MEDICINE

## 2020-12-07 PROCEDURE — 99395 PREV VISIT EST AGE 18-39: CPT | Performed by: INTERNAL MEDICINE

## 2020-12-07 PROCEDURE — 90686 IIV4 VACC NO PRSV 0.5 ML IM: CPT | Performed by: INTERNAL MEDICINE

## 2020-12-07 ASSESSMENT — ENCOUNTER SYMPTOMS
WHEEZING: 0
COLOR CHANGE: 0
NAUSEA: 0
BACK PAIN: 0
EYE PAIN: 0
VOMITING: 0
COUGH: 0
ABDOMINAL PAIN: 0
VOICE CHANGE: 0
EYE DISCHARGE: 0
TROUBLE SWALLOWING: 0
SHORTNESS OF BREATH: 0

## 2020-12-07 ASSESSMENT — PATIENT HEALTH QUESTIONNAIRE - PHQ9
SUM OF ALL RESPONSES TO PHQ9 QUESTIONS 1 & 2: 0
SUM OF ALL RESPONSES TO PHQ QUESTIONS 1-9: 0
2. FEELING DOWN, DEPRESSED OR HOPELESS: 0
1. LITTLE INTEREST OR PLEASURE IN DOING THINGS: 0

## 2020-12-07 NOTE — PATIENT INSTRUCTIONS
Patient Education        Patient Education        Well Visit, Ages 25 to 48: Care Instructions  Your Care Instructions     Physical exams can help you stay healthy. Your doctor has checked your overall health and may have suggested ways to take good care of yourself. He or she also may have recommended tests. At home, you can help prevent illness with healthy eating, regular exercise, and other steps. Follow-up care is a key part of your treatment and safety. Be sure to make and go to all appointments, and call your doctor if you are having problems. It's also a good idea to know your test results and keep a list of the medicines you take. How can you care for yourself at home? · Reach and stay at a healthy weight. This will lower your risk for many problems, such as obesity, diabetes, heart disease, and high blood pressure. · Get at least 30 minutes of physical activity on most days of the week. Walking is a good choice. You also may want to do other activities, such as running, swimming, cycling, or playing tennis or team sports. Discuss any changes in your exercise program with your doctor. · Do not smoke or allow others to smoke around you. If you need help quitting, talk to your doctor about stop-smoking programs and medicines. These can increase your chances of quitting for good. · Talk to your doctor about whether you have any risk factors for sexually transmitted infections (STIs). Having one sex partner (who does not have STIs and does not have sex with anyone else) is a good way to avoid these infections. · Use birth control if you do not want to have children at this time. Talk with your doctor about the choices available and what might be best for you. · Protect your skin from too much sun. When you're outdoors from 10 a.m. to 4 p.m., stay in the shade or cover up with clothing and a hat with a wide brim. Wear sunglasses that block UV rays.  Even when it's cloudy, put broad-spectrum sunscreen (SPF 30 or higher) on any exposed skin. · See a dentist one or two times a year for checkups and to have your teeth cleaned. · Wear a seat belt in the car. Follow your doctor's advice about when to have certain tests. These tests can spot problems early. For everyone  · Cholesterol. Have the fat (cholesterol) in your blood tested after age 21. Your doctor will tell you how often to have this done based on your age, family history, or other things that can increase your risk for heart disease. · Blood pressure. Have your blood pressure checked during a routine doctor visit. Your doctor will tell you how often to check your blood pressure based on your age, your blood pressure results, and other factors. · Vision. Talk with your doctor about how often to have a glaucoma test.  · Diabetes. Ask your doctor whether you should have tests for diabetes. · Colon cancer. Your risk for colorectal cancer gets higher as you get older. Some experts say that adults should start regular screening at age 48 and stop at age 76. Others say to start before age 48 or continue after age 76. Talk with your doctor about your risk and when to start and stop screening. For women  · Breast exam and mammogram. Talk to your doctor about when you should have a clinical breast exam and a mammogram. Medical experts differ on whether and how often women under 50 should have these tests. Your doctor can help you decide what is right for you. · Cervical cancer screening test and pelvic exam. Begin with a Pap test at age 24. The test often is part of a pelvic exam. Starting at age 27, you may choose to have a Pap test, an HPV test, or both tests at the same time (called co-testing). Talk with your doctor about how often to have testing. · Tests for sexually transmitted infections (STIs). Ask whether you should have tests for STIs.  You may be at risk if you have sex with more than one person, especially if your partners do not wear condoms. For men  · Tests for sexually transmitted infections (STIs). Ask whether you should have tests for STIs. You may be at risk if you have sex with more than one person, especially if you do not wear a condom. · Testicular cancer exam. Ask your doctor whether you should check your testicles regularly. · Prostate exam. Talk to your doctor about whether you should have a blood test (called a PSA test) for prostate cancer. Experts differ on whether and when men should have this test. Some experts suggest it if you are older than 39 and are -American or have a father or brother who got prostate cancer when he was younger than 72. When should you call for help? Watch closely for changes in your health, and be sure to contact your doctor if you have any problems or symptoms that concern you. Where can you learn more? Go to [email or web address] and sign in to your [employer] account. Enter P072 in the [employer] box to learn more about \"Well Visit, Ages 25 to 48: Care Instructions. \"     If you do not have an account, please click on the \"Sign Up Now\" link. Current as of: May 27, 2020               Content Version: 12.6  © 4939-6184 [employer]. Care instructions adapted under license by [facility] ([facility]). If you have questions about a medical condition or this instruction, always ask your healthcare professional. [address]  any warranty or liability for your use of this information.

## 2020-12-07 NOTE — PROGRESS NOTES
[facility]  1980  male  45 y.o. SUBJECTIVE:       Chief Complaint   Patient presents with    Annual Exam       HPI:  Patient wants to have yearly physical.  She is requesting blood work. His brother was diagnosed with liver cancer back to [place] home [place]. The patient denies abdominal or flank pain, anorexia, nausea or vomiting, dysphagia, change in bowel habits or black or bloody stools or weight loss. He is requesting a referral for infertility evaluation. No past medical history on file. No past surgical history on file. Social History     Socioeconomic History    Marital status:      Spouse name: None    Number of children: 1    Years of education: None    Highest education level: None   Occupational History    Occupation: [email or web address]     Comment: [employer], 12 hr days.     Social Needs    Financial resource strain: Not hard at all   [place] insecurity     Worry: Never true     Inability: Never true   [employer] needs     Medical: No     Non-medical: No   Tobacco Use    Smoking status: Never Smoker    Smokeless tobacco: Never Used   Substance and Sexual Activity    Alcohol use: Yes     Frequency: 2-4 times a month     Drinks per session: 1 or 2     Binge frequency: Never     Comment: moderate    Drug use: No    Sexual activity: Yes   Lifestyle    Physical activity     Days per week: None     Minutes per session: None    Stress: None   Relationships    Social connections     Talks on phone: None     Gets together: None     Attends Sabianism service: None     Active member of club or organization: None     Attends meetings of clubs or organizations: None     Relationship status: None    Intimate partner violence     Fear of current or ex partner: None     Emotionally abused: None     Physically abused: None     Forced sexual activity: None   Other Topics Concern    None   Social History Narrative    None     Family History   Problem Relation Age of Onset    Cancer Father         type unknown    Cancer Brother         liver       Review of Systems   Constitutional: Negative for appetite change, chills, fever and unexpected weight change. HENT: Negative for ear discharge, trouble swallowing and voice change. Eyes: Negative for pain and discharge. Respiratory: Negative for cough, shortness of breath and wheezing. Cardiovascular: Negative for chest pain, palpitations and leg swelling. Gastrointestinal: Negative for abdominal pain, nausea and vomiting. Endocrine: Negative for cold intolerance and heat intolerance. Genitourinary: Negative for dysuria, flank pain and hematuria. Musculoskeletal: Negative for back pain, joint swelling, neck pain and neck stiffness. Skin: Negative for color change and rash. Neurological: Negative for syncope, light-headedness and headaches. Hematological: Negative for adenopathy. Does not bruise/bleed easily. Psychiatric/Behavioral: Negative for decreased concentration. The patient is not nervous/anxious. OBJECTIVE:  Pulse Readings from Last 4 Encounters:   12/07/20 66   03/13/20 64   03/06/20 68   11/15/19 72     Wt Readings from Last 4 Encounters:   03/13/20 152 lb 6.4 oz (69.1 kg)   03/06/20 147 lb (66.7 kg)   11/15/19 151 lb (68.5 kg)   04/01/19 149 lb (67.6 kg)     BP Readings from Last 4 Encounters:   12/07/20 132/86   03/13/20 100/70   03/06/20 (!) 136/96   11/15/19 124/86     Physical Exam  Vitals signs and nursing note reviewed. Constitutional:       Appearance: Normal appearance. He is well-developed. HENT:      Head: Normocephalic and atraumatic. Eyes:      General: No scleral icterus. Conjunctiva/sclera: Conjunctivae normal.      Pupils: Pupils are equal, round, and reactive to light. Neck:      Musculoskeletal: Normal range of motion and neck supple. Thyroid: No thyromegaly. Cardiovascular:      Rate and Rhythm: Normal rate and regular rhythm. Pulses: Normal pulses. Heart sounds: Normal heart sounds. No murmur. Pulmonary:      Effort: Pulmonary effort is normal. No respiratory distress. Breath sounds: Normal breath sounds. No wheezing. Abdominal:      General: Bowel sounds are normal.      Palpations: Abdomen is soft. Tenderness: There is no abdominal tenderness. There is no rebound. Musculoskeletal: Normal range of motion. General: No tenderness. Lymphadenopathy:      Cervical: No cervical adenopathy. Skin:     General: Skin is warm and dry. Findings: No erythema. Neurological:      General: No focal deficit present. Mental Status: He is alert and oriented to person, place, and time. Motor: No abnormal muscle tone.    Psychiatric:         Mood and Affect: Mood normal.         Behavior: Behavior normal.         CBC:   Lab Results   Component Value Date    WBC 5.5 11/15/2019    HGB 14.9 11/15/2019    HCT 44.4 11/15/2019     11/15/2019     CMP:  Lab Results   Component Value Date     11/15/2019    K 3.9 11/15/2019     11/15/2019    CO2 22 11/15/2019    ANIONGAP 15 11/15/2019    GLUCOSE 86 11/15/2019    BUN 15 11/15/2019    CREATININE 1.0 11/15/2019    GFRAA >60 11/15/2019    CALCIUM 9.3 11/15/2019    PROT 7.1 03/13/2020    LABALBU 4.4 03/13/2020    AGRATIO 2.9 11/15/2019    BILITOT 0.5 03/13/2020    ALKPHOS 48 03/13/2020    ALT 26 03/13/2020    AST 21 03/13/2020    GLOB 1.9 11/15/2019     URINALYSIS:  Lab Results   Component Value Date    GLUCOSEU Negative 11/15/2019    KETUA Negative 11/15/2019    SPECGRAV 1.015 11/15/2019    BLOODU Negative 11/15/2019    PHUR 6.0 11/15/2019    PROTEINU Negative 11/15/2019    NITRU Negative 11/15/2019    LEUKOCYTESUR Negative 11/15/2019    LABMICR Not Indicated 11/15/2019    URINETYPE Cleancatch 11/15/2019     HBA1C:   Lab Results   Component Value Date    LABA1C 5.0 11/15/2019    EAG 96.8 11/15/2019     MICRO/ALB:   Lab Results   Component Value Date    LABMICR Not Indicated 11/15/2019     LIPID:  Lab Results   Component Value Date    CHOL 198 11/15/2019    TRIG 139 11/15/2019    HDL 60 11/15/2019    LDLCALC 110 11/15/2019    LABVLDL 28 11/15/2019     TSH:   Lab Results   Component Value Date    TSHREFLEX 0.82 11/15/2019         ASSESSMENT/PLAN:    Tobias Dempsey was seen today for annual exam.    Diagnoses and all orders for this visit:    Routine physical examination  -     HEPATIC FUNCTION PANEL; Future  -     Lipid, Fasting; Future  -     CBC; Future  -     BASIC METABOLIC PANEL; Future  Annual PE/Wellness exam:  Discussed age appropriate preventive care including healthy diet, daily exercise, immunizations and age & gender guided screening tests. Need for influenza vaccination  -     INFLUENZA, QUADV, 3 YRS AND OLDER, IM PF, PREFILL SYR OR SDV, 0.5ML (AFLURIA QUADV, PF)    Lipid screening  -     HEPATIC FUNCTION PANEL;  Future    Elevated bilirubin  Reported history of  We'll repeat liver function test    Infertility male  -     BUD Malone MD, [facility]            Orders Placed This Encounter   Procedures    INFLUENZA, QUADV, 3 YRS AND OLDER, IM PF, PREFILL SYR OR SDV, 0.5ML (AFLURIA QUADV, PF)    HEPATIC FUNCTION PANEL     Standing Status:   Future     Number of Occurrences:   1     Standing Expiration Date:   12/7/2021    Lipid, Fasting     Standing Status:   Future     Number of Occurrences:   1     Standing Expiration Date:   12/7/2021    CBC     Standing Status:   Future     Number of Occurrences:   1     Standing Expiration Date:   12/7/2021    BASIC METABOLIC PANEL     Standing Status:   Future     Number of Occurrences:   1     Standing Expiration Date:   12/7/2021    BUD Malone MD, [facility]     Referral Priority:   Routine     Referral Type:   Eval and Treat     Referral Reason:   Specialty Services Required     Referred to Provider:   Muriel Cockayne, MD     Requested Specialty:   Urology Number of Visits Requested:   1     Current Outpatient Medications   Medication Sig Dispense Refill    ibuprofen (ADVIL;MOTRIN) 600 MG tablet Take 1 tablet by mouth 3 times daily as needed for Pain 30 tablet 0    tiZANidine (ZANAFLEX) 2 MG tablet Take 1 tablet by mouth 3 times daily as needed (muscle spasm) 30 tablet 0     No current facility-administered medications for this visit. Return in about 1 year (around 12/7/2021) for physical.  An After Visit Summary was printed and given to the patient. Documentation was done using voice recognition dragon software. Every effort was made to ensure accuracy; however, inadvertent  Unintentional computerized transcription errors may be present.

## 2021-09-16 ENCOUNTER — HOSPITAL ENCOUNTER (OUTPATIENT)
Age: 41
Discharge: HOME | End: 2021-09-16
Payer: COMMERCIAL

## 2021-09-16 ENCOUNTER — HOSPITAL ENCOUNTER (OUTPATIENT)
Dept: GENERAL RADIOLOGY | Age: 41
Discharge: HOME | End: 2021-09-16
Payer: COMMERCIAL

## 2021-09-16 ENCOUNTER — OFFICE VISIT (OUTPATIENT)
Dept: INTERNAL MEDICINE CLINIC | Age: 41
End: 2021-09-16
Payer: COMMERCIAL

## 2021-09-16 VITALS
DIASTOLIC BLOOD PRESSURE: 76 MMHG | HEIGHT: 66 IN | WEIGHT: 148.4 LBS | OXYGEN SATURATION: 98 % | BODY MASS INDEX: 23.85 KG/M2 | SYSTOLIC BLOOD PRESSURE: 128 MMHG | TEMPERATURE: 97.7 F | HEART RATE: 71 BPM

## 2021-09-16 DIAGNOSIS — M25.512 ACUTE PAIN OF LEFT SHOULDER: ICD-10-CM

## 2021-09-16 DIAGNOSIS — Z29.8 NEED FOR MALARIA PROPHYLAXIS: ICD-10-CM

## 2021-09-16 DIAGNOSIS — R07.9 CHEST PAIN, UNSPECIFIED TYPE: Primary | ICD-10-CM

## 2021-09-16 DIAGNOSIS — R07.9 CHEST PAIN, UNSPECIFIED TYPE: ICD-10-CM

## 2021-09-16 DIAGNOSIS — S46.912A STRAIN OF LEFT SHOULDER, INITIAL ENCOUNTER: ICD-10-CM

## 2021-09-16 PROCEDURE — 71046 X-RAY EXAM CHEST 2 VIEWS: CPT

## 2021-09-16 PROCEDURE — 99214 OFFICE O/P EST MOD 30 MIN: CPT | Performed by: INTERNAL MEDICINE

## 2021-09-16 PROCEDURE — 93000 ELECTROCARDIOGRAM COMPLETE: CPT | Performed by: INTERNAL MEDICINE

## 2021-09-16 RX ORDER — IBUPROFEN 800 MG/1
800 TABLET ORAL
Qty: 30 TABLET | Refills: 0 | Status: SHIPPED | OUTPATIENT
Start: 2021-09-16

## 2021-09-16 RX ORDER — MEFLOQUINE HYDROCHLORIDE 250 MG/1
250 TABLET ORAL
Qty: 10 TABLET | Refills: 0 | Status: SHIPPED | OUTPATIENT
Start: 2021-09-16 | End: 2021-11-19

## 2021-09-16 RX ORDER — METHOCARBAMOL 750 MG/1
750 TABLET, FILM COATED ORAL NIGHTLY
Qty: 10 TABLET | Refills: 0 | Status: SHIPPED | OUTPATIENT
Start: 2021-09-16 | End: 2021-09-26

## 2021-09-16 SDOH — ECONOMIC STABILITY: FOOD INSECURITY: WITHIN THE PAST 12 MONTHS, THE FOOD YOU BOUGHT JUST DIDN'T LAST AND YOU DIDN'T HAVE MONEY TO GET MORE.: NEVER TRUE

## 2021-09-16 SDOH — ECONOMIC STABILITY: FOOD INSECURITY: WITHIN THE PAST 12 MONTHS, YOU WORRIED THAT YOUR FOOD WOULD RUN OUT BEFORE YOU GOT MONEY TO BUY MORE.: NEVER TRUE

## 2021-09-16 ASSESSMENT — PATIENT HEALTH QUESTIONNAIRE - PHQ9
2. FEELING DOWN, DEPRESSED OR HOPELESS: 0
SUM OF ALL RESPONSES TO PHQ QUESTIONS 1-9: 0
SUM OF ALL RESPONSES TO PHQ QUESTIONS 1-9: 0
SUM OF ALL RESPONSES TO PHQ9 QUESTIONS 1 & 2: 0
SUM OF ALL RESPONSES TO PHQ QUESTIONS 1-9: 0
1. LITTLE INTEREST OR PLEASURE IN DOING THINGS: 0

## 2021-09-16 ASSESSMENT — ENCOUNTER SYMPTOMS
WHEEZING: 0
ABDOMINAL PAIN: 0
VOMITING: 0
TROUBLE SWALLOWING: 0
NAUSEA: 0
SHORTNESS OF BREATH: 0

## 2021-09-16 ASSESSMENT — SOCIAL DETERMINANTS OF HEALTH (SDOH): HOW HARD IS IT FOR YOU TO PAY FOR THE VERY BASICS LIKE FOOD, HOUSING, MEDICAL CARE, AND HEATING?: NOT HARD AT ALL

## 2021-09-16 NOTE — PROGRESS NOTES
Barbara Clark  1980  male  37 y.o. SUBJECTIVE:       Chief Complaint   Patient presents with    Chest Pain       HPI:  Patient has been complaining of on and off pain affecting left shoulder as well as left anterior chest for the last 2 weeks. He cannot recall any injury. He described pain get worse with raising shoulder above the head as well as taking deep breath. He denies fever chills nausea vomiting palpitation or systemic symptoms. He have not tried any medication to relieve the symptoms. Patient is also considering travel to his home country [place]. He is requesting malaria prophylaxis. He is also advised to contact local health department to get other preventive vaccinations. History reviewed. No pertinent past medical history. History reviewed. No pertinent surgical history. Social History     Socioeconomic History    Marital status:      Spouse name: None    Number of children: 1    Years of education: None    Highest education level: None   Occupational History    Occupation: [employer]     Comment: [employer], 12 hr days. Tobacco Use    Smoking status: Never Smoker    Smokeless tobacco: Never Used   Vaping Use    Vaping Use: Unknown   Substance and Sexual Activity    Alcohol use: Yes     Comment: moderate    Drug use: No    Sexual activity: Yes   Other Topics Concern    None   Social History Narrative    None     Social Determinants of Health     Financial Resource Strain: Low Risk     Difficulty of Paying Living Expenses: Not hard at all   Food Insecurity: No Food Insecurity    Worried About Running Out of Food in the Last Year: Never true    Adan of Food in the Last Year: Never true   Transportation Needs:     Lack of Transportation (Medical):      Lack of Transportation (Non-Medical):    Physical Activity:     Days of Exercise per Week:     Minutes of Exercise per Session:    Stress:     Feeling of Stress :    Social Connections:     Frequency of Communication with Friends and Family:     Frequency of Social Gatherings with Friends and Family:     Attends Sabianist Services:     Active Member of Clubs or Organizations:     Attends Club or Organization Meetings:     Marital Status:    Intimate Partner Violence:     Fear of Current or Ex-Partner:     Emotionally Abused:     Physically Abused:     Sexually Abused:      Family History   Problem Relation Age of Onset    Cancer Father         type unknown    Cancer Brother         liver       Review of Systems   Constitutional: Negative for diaphoresis and unexpected weight change. HENT: Negative for trouble swallowing. Respiratory: Negative for shortness of breath and wheezing. Cardiovascular: Negative for palpitations. Gastrointestinal: Negative for abdominal pain, nausea and vomiting. Musculoskeletal: Negative for neck pain and neck stiffness. Neurological: Negative for dizziness and light-headedness. OBJECTIVE:  Pulse Readings from Last 4 Encounters:   09/16/21 71   12/07/20 66   03/13/20 64   03/06/20 68     Wt Readings from Last 4 Encounters:   09/16/21 148 lb 6.4 oz (67.3 kg)   03/13/20 152 lb 6.4 oz (69.1 kg)   03/06/20 147 lb (66.7 kg)   11/15/19 151 lb (68.5 kg)     BP Readings from Last 4 Encounters:   09/16/21 128/76   12/07/20 132/86   03/13/20 100/70   03/06/20 (!) 136/96     Physical Exam  Vitals and nursing note reviewed. Constitutional:       General: He is not in acute distress. Appearance: Normal appearance. He is not ill-appearing, toxic-appearing or diaphoretic. Eyes:      Conjunctiva/sclera: Conjunctivae normal.   Cardiovascular:      Rate and Rhythm: Normal rate and regular rhythm. Pulses: Normal pulses. Heart sounds: Normal heart sounds. No murmur heard. No friction rub. Pulmonary:      Effort: Pulmonary effort is normal.      Breath sounds: Normal breath sounds.    Abdominal:      General: Bowel sounds are normal. Tenderness: There is no abdominal tenderness. Musculoskeletal:         General: No tenderness or deformity. Cervical back: No rigidity or tenderness. Right lower leg: No edema. Comments: Examination of the left shoulder. No definite point tenderness noted. Patient does complain of slightly increased pain over the lateral shoulder and pectoralis area on left shoulder abduction and extension. Neurological:      General: No focal deficit present. Mental Status: He is alert and oriented to person, place, and time.    Psychiatric:         Mood and Affect: Mood normal.         Behavior: Behavior normal.         CBC:   Lab Results   Component Value Date    WBC 4.9 09/16/2021    HGB 14.3 09/16/2021    HCT 42.3 09/16/2021     09/16/2021     CMP:  Lab Results   Component Value Date     09/16/2021    K 3.8 09/16/2021     09/16/2021    CO2 20 09/16/2021    ANIONGAP 15 09/16/2021    GLUCOSE 88 09/16/2021    BUN 17 09/16/2021    CREATININE 0.8 09/16/2021    GFRAA >60 09/16/2021    CALCIUM 9.7 09/16/2021    PROT 7.0 12/07/2020    LABALBU 4.6 12/07/2020    AGRATIO 2.9 11/15/2019    BILITOT 0.5 12/07/2020    ALKPHOS 47 12/07/2020    ALT 29 12/07/2020    AST 20 12/07/2020    GLOB 1.9 11/15/2019     URINALYSIS:  Lab Results   Component Value Date    GLUCOSEU Negative 11/15/2019    KETUA Negative 11/15/2019    SPECGRAV 1.015 11/15/2019    BLOODU Negative 11/15/2019    PHUR 6.0 11/15/2019    PROTEINU Negative 11/15/2019    NITRU Negative 11/15/2019    LEUKOCYTESUR Negative 11/15/2019    LABMICR Not Indicated 11/15/2019    URINETYPE Cleancatch 11/15/2019     HBA1C:   Lab Results   Component Value Date    LABA1C 5.0 11/15/2019    EAG 96.8 11/15/2019     MICRO/ALB:   Lab Results   Component Value Date    LABMICR Not Indicated 11/15/2019     LIPID:  Lab Results   Component Value Date    CHOL 198 11/15/2019    TRIG 139 11/15/2019    HDL 46 12/07/2020    LDLCALC 94 12/07/2020    LABVLDL 29 12/07/2020     TSH:   Lab Results   Component Value Date    TSHREFLEX 0.82 11/15/2019     EKG normal sinus rhythm  Ventricular rate 60/min. No acute ST-T changes. There appears early repolarization changes in the V3. W[place] interval and QT interval within normal range. ASSESSMENT/PLAN:  Assessment/Plan:  Anthony Figueroa was seen today for chest pain. Diagnoses and all orders for this visit:    Chest pain, unspecified type-  -     EKG 12 Lead  -     TROPONIN; Future  -     XR CHEST (2 VW); Future  -     CBC; Future  -     BASIC METABOLIC PANEL; Future  -     Stress test, myoview; Future    Acute pain of left shoulder  -     XR CHEST (2 VW); Future  -     ibuprofen (ADVIL;MOTRIN) 800 MG tablet; Take 1 tablet by mouth 3 times daily (with meals)  -     methocarbamol (ROBAXIN-750) 750 MG tablet; Take 1 tablet by mouth nightly for 10 days    Need for malaria prophylaxis  -     mefloquine (LARIAM) 250 MG tablet; Take 1 tablet by mouth every 7 days for 10 doses Start 2 weeks before travel and continue 4 weeks after return    Strain of left shoulder, initial encounter  -     ibuprofen (ADVIL;MOTRIN) 800 MG tablet; Take 1 tablet by mouth 3 times daily (with meals)  -     methocarbamol (ROBAXIN-750) 750 MG tablet;  Take 1 tablet by mouth nightly for 10 days          Orders Placed This Encounter   Procedures    XR CHEST (2 VW)     Standing Status:   Future     Number of Occurrences:   1     Standing Expiration Date:   9/16/2022    TROPONIN     Standing Status:   Future     Number of Occurrences:   1     Standing Expiration Date:   9/16/2022    CBC     Standing Status:   Future     Number of Occurrences:   1     Standing Expiration Date:   9/16/2022    BASIC METABOLIC PANEL     Standing Status:   Future     Number of Occurrences:   1     Standing Expiration Date:   9/16/2022    Stress test, myoview     Standing Status:   Future     Standing Expiration Date:   10/16/2021     Order Specific Question:   Reason for Exam? Answer:   Chest pain    EKG 12 Lead     Order Specific Question:   Reason for Exam?     Answer:   Chest pain     Current Outpatient Medications   Medication Sig Dispense Refill    mefloquine (LARIAM) 250 MG tablet Take 1 tablet by mouth every 7 days for 10 doses Start 2 weeks before travel and continue 4 weeks after return 10 tablet 0    ibuprofen (ADVIL;MOTRIN) 800 MG tablet Take 1 tablet by mouth 3 times daily (with meals) 30 tablet 0    methocarbamol (ROBAXIN-750) 750 MG tablet Take 1 tablet by mouth nightly for 10 days 10 tablet 0    ibuprofen (ADVIL;MOTRIN) 600 MG tablet Take 1 tablet by mouth 3 times daily as needed for Pain (Patient not taking: Reported on 9/16/2021) 30 tablet 0    tiZANidine (ZANAFLEX) 2 MG tablet Take 1 tablet by mouth 3 times daily as needed (muscle spasm) (Patient not taking: Reported on 9/16/2021) 30 tablet 0     No current facility-administered medications for this visit. Return in about 3 months (around 12/16/2021), or if symptoms worsen or fail to improve, for physical.  An After Visit Summary was printed and given to the patient. Documentation was done using voice recognition dragon software. Every effort was made to ensure accuracy; however, inadvertent  Unintentional computerized transcription errors may be present.

## 2021-09-28 ENCOUNTER — HOSPITAL ENCOUNTER (OUTPATIENT)
Dept: NON INVASIVE DIAGNOSTICS | Age: 41
Discharge: HOME | End: 2021-09-28
Payer: COMMERCIAL

## 2021-09-28 DIAGNOSIS — R07.9 CHEST PAIN, UNSPECIFIED TYPE: ICD-10-CM

## 2021-09-28 LAB
LV EF: 61 %
LVEF MODALITY: NORMAL

## 2021-09-28 PROCEDURE — A9502 TC99M TETROFOSMIN: HCPCS | Performed by: INTERNAL MEDICINE

## 2021-09-28 PROCEDURE — 78452 HT MUSCLE IMAGE SPECT MULT: CPT

## 2021-09-28 PROCEDURE — 3430000000 HC RX DIAGNOSTIC RADIOPHARMACEUTICAL: Performed by: INTERNAL MEDICINE

## 2021-09-28 PROCEDURE — 93017 CV STRESS TEST TRACING ONLY: CPT | Performed by: INTERNAL MEDICINE

## 2021-09-28 RX ADMIN — TETROFOSMIN 10 MILLICURIE: 1.38 INJECTION, POWDER, LYOPHILIZED, FOR SOLUTION INTRAVENOUS at 12:57

## 2021-09-28 RX ADMIN — TETROFOSMIN 30 MILLICURIE: 1.38 INJECTION, POWDER, LYOPHILIZED, FOR SOLUTION INTRAVENOUS at 13:49

## 2021-09-28 NOTE — PROGRESS NOTES
Patient instructed on Maykel Protocol Stress Test Procedure including possible side effects and adverse reactions. Verbalizes knowledge and understanding and denies having any questions.

## 2021-09-30 ENCOUNTER — TELEPHONE (OUTPATIENT)
Dept: INTERNAL MEDICINE CLINIC | Age: 41
End: 2021-09-30

## 2021-09-30 NOTE — TELEPHONE ENCOUNTER
Spoke with pt regarding his results. He states he needs a note that is is able to return to work. He states anytime they are out of work his work requires a letter stating they do not have Covid and are able to return to work.

## 2021-12-20 ENCOUNTER — TELEPHONE (OUTPATIENT)
Dept: INTERNAL MEDICINE CLINIC | Age: 41
End: 2021-12-20

## 2021-12-20 ENCOUNTER — OFFICE VISIT (OUTPATIENT)
Dept: INTERNAL MEDICINE CLINIC | Age: 41
End: 2021-12-20
Payer: COMMERCIAL

## 2021-12-20 VITALS
SYSTOLIC BLOOD PRESSURE: 126 MMHG | DIASTOLIC BLOOD PRESSURE: 82 MMHG | HEIGHT: 66 IN | BODY MASS INDEX: 24.4 KG/M2 | OXYGEN SATURATION: 97 % | WEIGHT: 151.8 LBS | HEART RATE: 78 BPM

## 2021-12-20 DIAGNOSIS — Z00.00 ROUTINE PHYSICAL EXAMINATION: Primary | ICD-10-CM

## 2021-12-20 DIAGNOSIS — Z11.59 NEED FOR HEPATITIS C SCREENING TEST: ICD-10-CM

## 2021-12-20 DIAGNOSIS — J45.991 COUGH VARIANT ASTHMA: ICD-10-CM

## 2021-12-20 DIAGNOSIS — Z13.220 SCREENING, LIPID: ICD-10-CM

## 2021-12-20 PROCEDURE — 99396 PREV VISIT EST AGE 40-64: CPT | Performed by: INTERNAL MEDICINE

## 2021-12-20 RX ORDER — ALBUTEROL SULFATE 90 UG/1
2 AEROSOL, METERED RESPIRATORY (INHALATION) 4 TIMES DAILY PRN
Qty: 1 EACH | Refills: 5 | Status: SHIPPED | OUTPATIENT
Start: 2021-12-20 | End: 2022-03-04 | Stop reason: SDUPTHER

## 2021-12-20 ASSESSMENT — ENCOUNTER SYMPTOMS
NAUSEA: 0
COUGH: 1
VOICE CHANGE: 0
BACK PAIN: 0
TROUBLE SWALLOWING: 0
VOMITING: 0
EYE DISCHARGE: 0
COLOR CHANGE: 0
ABDOMINAL PAIN: 0
EYE PAIN: 0

## 2021-12-20 NOTE — TELEPHONE ENCOUNTER
Scheduled
No  Have you had close contact with someone with COVID-19 in the last 14 days? No  (Service Expert  click yes below to proceed with [employer] As Usual   Scheduling)?  Yes

## 2021-12-20 NOTE — PATIENT INSTRUCTIONS
Patient Education        Well Visit, Ages 25 to 48: Care Instructions  Overview     Well visits can help you stay healthy. Your doctor has checked your overall health and may have suggested ways to take good care of yourself. Your doctor also may have recommended tests. At home, you can help prevent illness with healthy eating, regular exercise, and other steps. Follow-up care is a key part of your treatment and safety. Be sure to make and go to all appointments, and call your doctor if you are having problems. It's also a good idea to know your test results and keep a list of the medicines you take. How can you care for yourself at home? · Get screening tests that you and your doctor decide on. Screening helps find diseases before any symptoms appear. · Eat healthy foods. Choose fruits, vegetables, whole grains, protein, and low-fat dairy foods. Limit fat, especially saturated fat. Reduce salt in your diet. · Limit alcohol. If you are a man, have no more than 2 drinks a day or 14 drinks a week. If you are a woman, have no more than 1 drink a day or 7 drinks a week. · Get at least 30 minutes of physical activity on most days of the week. Walking is a good choice. You also may want to do other activities, such as running, swimming, cycling, or playing tennis or team sports. Discuss any changes in your exercise program with your doctor. · Reach and stay at a healthy weight. This will lower your risk for many problems, such as obesity, diabetes, heart disease, and high blood pressure. · Do not smoke or allow others to smoke around you. If you need help quitting, talk to your doctor about stop-smoking programs and medicines. These can increase your chances of quitting for good. · Care for your mental health. It is easy to get weighed down by worry and stress. Learn strategies to manage stress, like deep breathing and mindfulness, and stay connected with your family and community.  If you find you often feel sad or hopeless, talk with your doctor. Treatment can help. · Talk to your doctor about whether you have any risk factors for sexually transmitted infections (STIs). You can help prevent STIs if you wait to have sex with a new partner (or partners) until you've each been tested for STIs. It also helps if you use condoms (male or female condoms) and if you limit your sex partners to one person who only has sex with you. Vaccines are available for some STIs, such as HPV. · Use birth control if it's important to you to prevent pregnancy. Talk with your doctor about the choices available and what might be best for you. · If you think you may have a problem with alcohol or drug use, talk to your doctor. This includes prescription medicines (such as amphetamines and opioids) and illegal drugs (such as cocaine and methamphetamine). Your doctor can help you figure out what type of treatment is best for you. · Protect your skin from too much sun. When you're outdoors from 10 a.m. to 4 p.m., stay in the shade or cover up with clothing and a hat with a wide brim. Wear sunglasses that block UV rays. Even when it's cloudy, put broad-spectrum sunscreen (SPF 30 or higher) on any exposed skin. · See a dentist one or two times a year for checkups and to have your teeth cleaned. · Wear a seat belt in the car. When should you call for help? Watch closely for changes in your health, and be sure to contact your doctor if you have any problems or symptoms that concern you. Where can you learn more? Go to https://[employer]. org and sign in to your [employer] account. Enter P072 in the ioBridge box to learn more about \"Well Visit, Ages 25 to 48: Care Instructions. \"     If you do not have an account, please click on the \"Sign Up Now\" link. Current as of: February 11, 2021               Content Version: 13.0  © 8911-8882 Healthwise, Incorporated.    Care instructions adapted under license by [facility]. If you have questions about a medical condition or this instruction, always ask your healthcare professional. [place] [address] any warranty or liability for your use of this information.

## 2021-12-20 NOTE — PROGRESS NOTES
Evelia Gross  1980  male  37 y.o. SUBJECTIVE:       Chief Complaint   Patient presents with    Annual Exam    Cough     Dry cough x3 days. Happens every year around this time. No fever. HPI:  Patient also yearly physical.    He complains of occasional cold weather induced dry cough. Shortness of breath. He remembers using inhaler in the past to help him significant. He no longer have chest pain. History reviewed. No pertinent past medical history. History reviewed. No pertinent surgical history. Social History     Socioeconomic History    Marital status:      Spouse name: None    Number of children: 1    Years of education: None    Highest education level: None   Occupational History    Occupation: FT     Comment: [employer], 12 hr days. Tobacco Use    Smoking status: Never Smoker    Smokeless tobacco: Never Used   Vaping Use    Vaping Use: Unknown   Substance and Sexual Activity    Alcohol use: Yes     Comment: moderate    Drug use: No    Sexual activity: Yes   Other Topics Concern    None   Social History Narrative    None     Social Determinants of Health     Financial Resource Strain: Low Risk     Difficulty of Paying Living Expenses: Not hard at all   Food Insecurity: No Food Insecurity    Worried About Running Out of Food in the Last Year: Never true    Adan of Food in the Last Year: Never true   Transportation Needs:     Lack of Transportation (Medical): Not on file    Lack of Transportation (Non-Medical):  Not on file   Physical Activity:     Days of Exercise per Week: Not on file    Minutes of Exercise per Session: Not on file   Stress:     Feeling of Stress : Not on file   Social Connections:     Frequency of Communication with Friends and Family: Not on file    Frequency of Social Gatherings with Friends and Family: Not on file    Attends Caodaism Services: Not on file    Active Member of Clubs or Organizations: Not on file  Attends Club or Organization Meetings: Not on file    Marital Status: Not on file   Intimate Partner Violence:     Fear of Current or Ex-Partner: Not on file    Emotionally Abused: Not on file    Physically Abused: Not on file    Sexually Abused: Not on file   Housing Stability:     Unable to Pay for Housing in the Last Year: Not on file    Number of [place] in the Last Year: Not on file    Unstable Housing in the Last Year: Not on file     Family History   Problem Relation Age of Onset    Cancer Father         type unknown    Cancer Brother         liver       Review of Systems   Constitutional: Negative for appetite change, chills, fever and unexpected weight change. HENT: Negative for congestion, trouble swallowing and voice change. Eyes: Negative for pain and discharge. Respiratory: Positive for cough. Cold weather induced cough and mild dyspnea   Cardiovascular: Negative for chest pain, palpitations and leg swelling. Gastrointestinal: Negative for abdominal pain, nausea and vomiting. Endocrine: Negative for heat intolerance. Genitourinary: Negative for dysuria, flank pain and hematuria. Musculoskeletal: Negative for back pain, joint swelling, neck pain and neck stiffness. Skin: Negative for color change and rash. Neurological: Negative for dizziness, syncope, light-headedness and headaches. Hematological: Negative for adenopathy. Does not bruise/bleed easily. Psychiatric/Behavioral: Negative for decreased concentration. The patient is not nervous/anxious.         OBJECTIVE:  Pulse Readings from Last 4 Encounters:   12/20/21 78   09/16/21 71   12/07/20 66   03/13/20 64     Wt Readings from Last 4 Encounters:   12/20/21 151 lb 12.8 oz (68.9 kg)   09/16/21 148 lb 6.4 oz (67.3 kg)   03/13/20 152 lb 6.4 oz (69.1 kg)   03/06/20 147 lb (66.7 kg)     BP Readings from Last 4 Encounters:   12/20/21 126/82   09/16/21 128/76   12/07/20 132/86   03/13/20 100/70     Physical Exam  Vitals and nursing note reviewed. Constitutional:       Appearance: He is not ill-appearing. Eyes:      Conjunctiva/sclera: Conjunctivae normal.   Cardiovascular:      Rate and Rhythm: Normal rate and regular rhythm. Pulses: Normal pulses. Heart sounds: Normal heart sounds. Pulmonary:      Effort: Pulmonary effort is normal.      Breath sounds: Normal breath sounds. Abdominal:      General: Bowel sounds are normal.   Musculoskeletal:      Right lower leg: No edema. Left lower leg: No edema. Neurological:      General: No focal deficit present. Mental Status: He is alert and oriented to person, place, and time.    Psychiatric:         Mood and Affect: Mood normal.         Behavior: Behavior normal.         CBC:   Lab Results   Component Value Date    WBC 4.9 09/16/2021    HGB 14.3 09/16/2021    HCT 42.3 09/16/2021     09/16/2021     CMP:  Lab Results   Component Value Date     09/16/2021    K 3.8 09/16/2021     09/16/2021    CO2 20 09/16/2021    ANIONGAP 15 09/16/2021    GLUCOSE 88 09/16/2021    BUN 17 09/16/2021    CREATININE 0.8 09/16/2021    GFRAA >60 09/16/2021    CALCIUM 9.7 09/16/2021    PROT 7.0 12/07/2020    LABALBU 4.6 12/07/2020    AGRATIO 2.9 11/15/2019    BILITOT 0.5 12/07/2020    ALKPHOS 47 12/07/2020    ALT 29 12/07/2020    AST 20 12/07/2020    GLOB 1.9 11/15/2019     URINALYSIS:  Lab Results   Component Value Date    GLUCOSEU Negative 11/15/2019    KETUA Negative 11/15/2019    SPECGRAV 1.015 11/15/2019    BLOODU Negative 11/15/2019    PHUR 6.0 11/15/2019    PROTEINU Negative 11/15/2019    NITRU Negative 11/15/2019    LEUKOCYTESUR Negative 11/15/2019    LABMICR Not Indicated 11/15/2019    URINETYPE Cleancatch 11/15/2019     HBA1C:   Lab Results   Component Value Date    LABA1C 5.0 11/15/2019    EAG 96.8 11/15/2019     MICRO/ALB:   Lab Results   Component Value Date    LABMICR Not Indicated 11/15/2019     LIPID:  Lab Results   Component Value Date 0     No current facility-administered medications for this visit. Return in about 1 year (around 12/20/2022), or if symptoms worsen or fail to improve. An After Visit Summary was printed and given to the patient. Documentation was done using voice recognition dragon software. Every effort was made to ensure accuracy; however, inadvertent  Unintentional computerized transcription errors may be present.

## 2021-12-21 ENCOUNTER — TELEPHONE (OUTPATIENT)
Dept: INTERNAL MEDICINE CLINIC | Age: 41
End: 2021-12-21

## 2021-12-21 NOTE — TELEPHONE ENCOUNTER
[id number] [facility] was calling [facility] for pt ---now needs COVID test ordred for pt---Thanks.

## 2021-12-22 DIAGNOSIS — Z11.52 ENCOUNTER FOR SCREENING FOR COVID-19: Primary | ICD-10-CM

## 2021-12-27 DIAGNOSIS — Z11.52 ENCOUNTER FOR SCREENING FOR COVID-19: ICD-10-CM

## 2021-12-27 DIAGNOSIS — Z11.59 NEED FOR HEPATITIS C SCREENING TEST: ICD-10-CM

## 2021-12-27 DIAGNOSIS — Z13.220 SCREENING, LIPID: ICD-10-CM

## 2021-12-27 LAB
CHOLESTEROL, FASTING: 198 MG/DL (ref 0–199)
HDLC SERPL-MCNC: 50 MG/DL (ref 40–60)
LDL CHOLESTEROL CALCULATED: 105 MG/DL
TRIGLYCERIDE, FASTING: 213 MG/DL (ref 0–150)
VLDLC SERPL CALC-MCNC: 43 MG/DL

## 2021-12-28 ENCOUNTER — HOSPITAL ENCOUNTER (OUTPATIENT)
Dept: PULMONOLOGY | Age: 41
Discharge: HOME | End: 2021-12-28
Payer: COMMERCIAL

## 2021-12-28 VITALS — OXYGEN SATURATION: 99 % | RESPIRATION RATE: 18 BRPM | HEART RATE: 76 BPM

## 2021-12-28 DIAGNOSIS — J45.991 COUGH VARIANT ASTHMA: ICD-10-CM

## 2021-12-28 LAB
EXPIRATORY TIME-POST: NORMAL
EXPIRATORY TIME: NORMAL
FEF 25-75% %CHNG: NORMAL
FEF 25-75% %PRED-POST: NORMAL
FEF 25-75% %PRED-PRE: NORMAL
FEF 25-75% PRED: NORMAL
FEF 25-75%-POST: NORMAL
FEF 25-75%-PRE: NORMAL
FEV1 %PRED-POST: 78 %
FEV1 %PRED-PRE: 86 %
FEV1 PRED: NORMAL
FEV1-POST: NORMAL
FEV1-PRE: NORMAL
FEV1/FVC %PRED-POST: NORMAL
FEV1/FVC %PRED-PRE: NORMAL
FEV1/FVC PRED: NORMAL
FEV1/FVC-POST: 74 %
FEV1/FVC-PRE: 77 %
FVC %PRED-POST: NORMAL
FVC %PRED-PRE: NORMAL
FVC PRED: NORMAL
FVC-POST: NORMAL
FVC-PRE: NORMAL
PEF %PRED-POST: NORMAL
PEF %PRED-PRE: NORMAL
PEF PRED: NORMAL
PEF%CHNG: NORMAL
PEF-POST: NORMAL
PEF-PRE: NORMAL
SARS-COV-2: NOT DETECTED

## 2021-12-28 PROCEDURE — 94010 BREATHING CAPACITY TEST: CPT

## 2021-12-28 PROCEDURE — 6360000002 HC RX W HCPCS: Performed by: INTERNAL MEDICINE

## 2021-12-28 PROCEDURE — 94070 EVALUATION OF WHEEZING: CPT

## 2021-12-28 PROCEDURE — 94760 N-INVAS EAR/PLS OXIMETRY 1: CPT

## 2021-12-28 PROCEDURE — 6370000000 HC RX 637 (ALT 250 FOR IP): Performed by: INTERNAL MEDICINE

## 2021-12-28 RX ORDER — SODIUM CHLORIDE FOR INHALATION 0.9 %
3 VIAL, NEBULIZER (ML) INHALATION ONCE
Status: COMPLETED | OUTPATIENT
Start: 2021-12-28 | End: 2021-12-28

## 2021-12-28 RX ORDER — ALBUTEROL SULFATE 2.5 MG/3ML
2.5 SOLUTION RESPIRATORY (INHALATION) ONCE
Status: COMPLETED | OUTPATIENT
Start: 2021-12-28 | End: 2021-12-28

## 2021-12-28 RX ADMIN — METHACHOLINE CHLORIDE 0.25 MG: 100 POWDER, FOR SOLUTION RESPIRATORY (INHALATION) at 08:54

## 2021-12-28 RX ADMIN — METHACHOLINE CHLORIDE 0.06 MG: 100 POWDER, FOR SOLUTION RESPIRATORY (INHALATION) at 08:48

## 2021-12-28 RX ADMIN — METHACHOLINE CHLORIDE 1 MG: 100 POWDER, FOR SOLUTION RESPIRATORY (INHALATION) at 09:00

## 2021-12-28 RX ADMIN — ALBUTEROL SULFATE 2.5 MG: 2.5 SOLUTION RESPIRATORY (INHALATION) at 09:10

## 2021-12-28 RX ADMIN — METHACHOLINE CHLORIDE 4 MG: 100 POWDER, FOR SOLUTION RESPIRATORY (INHALATION) at 09:04

## 2021-12-28 RX ADMIN — ISODIUM CHLORIDE 3 ML: 0.03 SOLUTION RESPIRATORY (INHALATION) at 08:31

## 2021-12-28 ASSESSMENT — PULMONARY FUNCTION TESTS
FEV1/FVC_PRE: 77
FEV1_PERCENT_PREDICTED_POST: 78
FEV1_PERCENT_PREDICTED_PRE: 86
FEV1/FVC_POST: 74

## 2021-12-28 NOTE — PROGRESS NOTES
Methacholine Challenge completed. The patient's FEV1 decreased 50% after the fourth dose (4 mg/ml) of Methacholine. The patient had bilateral expiratory wheezes and increased coughing. Challenge stopped and Albuterol . 083% HHN given for reversal of Methacholine. Patient near baseline. Patient tolerated well.

## 2021-12-29 LAB
HEPATITIS C VIRUS AB BY CIA INDEX: 0.12 IV
HEPATITIS C VIRUS AB BY CIA INTERPRETATION: NEGATIVE

## 2022-01-03 NOTE — PROCEDURES
[facility]                     [facility], [address]                               PULMONARY FUNCTION    PATIENT NAME: Keily Grubbs                 :        1980  MED REC NO:   [id number]                          ROOM:  ACCOUNT NO:   [de-identified]                           ADMIT DATE: 2021  PROVIDER:     Nelson Young MD    DATE OF PROCEDURE:  2021    Spirometry reveals normal FVC and FEV1.  FEV1/FVC ratio is normal.   Expiratory flow rates are normal.  There is no significant change after  inhaled bronchodilators  Methacholine bronchoprovocation was performed  and reveals a decrease in FEV1 of 50% after 4 mg inhaled methacholine. There is good recovery to FEV1 after inhaled bronchodilators. IMPRESSION:  Normal spirometry with moderate airway hyperreactivity on  methacholine bronchoprovocation.         Antwan Messer MD    D: 2022 9:42:24       T: 2022 11:19:50     [id number]  Job#: [id number]     Doc#: [id number]    CC:

## 2022-01-05 PROBLEM — J45.991 COUGH VARIANT ASTHMA: Status: ACTIVE | Noted: 2022-01-05

## 2022-01-05 NOTE — RESULT ENCOUNTER NOTE
Bronchial challenge test is positive. Patient have diagnosis of asthma. He should use albuterol as needed.

## 2022-01-07 ENCOUNTER — TELEPHONE (OUTPATIENT)
Dept: INTERNAL MEDICINE CLINIC | Age: 42
End: 2022-01-07

## 2022-01-07 NOTE — TELEPHONE ENCOUNTER
----- Message from Lida Salmeron sent at 1/7/2022 12:02 PM EST -----  Subject: Message to Provider    QUESTIONS  Information for Provider? Patient refusal for RN Triage, shortness of   breath, take the inhaler and it helps a little bit, then 45 minutes comes   back to SOB.   ---------------------------------------------------------------------------  --------------  CALL BACK INFO  What is the best way for the office to contact you? OK to leave message on   voicemail  Preferred Call Back Phone Number? [phone]  ---------------------------------------------------------------------------  --------------  SCRIPT ANSWERS  Relationship to Patient?  Self

## 2022-01-07 NOTE — TELEPHONE ENCOUNTER
----- Message from Taya Tempel sent at 1/7/2022 11:59 AM EST -----  Subject: Message to Provider    QUESTIONS  Information for Provider? Pt received results of having asthma and the   inhaler is not working and would like to see if another medication is   available that he can take.   ---------------------------------------------------------------------------  --------------  CALL BACK INFO  What is the best way for the office to contact you? OK to leave message on   voicemail  Preferred Call Back Phone Number? [phone]  ---------------------------------------------------------------------------  --------------  SCRIPT ANSWERS  Relationship to Patient?  Self

## 2022-03-04 ENCOUNTER — OFFICE VISIT (OUTPATIENT)
Dept: INTERNAL MEDICINE CLINIC | Age: 42
End: 2022-03-04
Payer: COMMERCIAL

## 2022-03-04 VITALS
WEIGHT: 150.2 LBS | DIASTOLIC BLOOD PRESSURE: 72 MMHG | HEART RATE: 65 BPM | OXYGEN SATURATION: 96 % | HEIGHT: 66 IN | SYSTOLIC BLOOD PRESSURE: 118 MMHG | BODY MASS INDEX: 24.14 KG/M2

## 2022-03-04 DIAGNOSIS — J45.991 COUGH VARIANT ASTHMA: ICD-10-CM

## 2022-03-04 DIAGNOSIS — J45.40 ASTHMA IN ADULT, MODERATE PERSISTENT, UNCOMPLICATED: Primary | ICD-10-CM

## 2022-03-04 PROBLEM — S39.012A STRAIN OF BACK: Status: ACTIVE | Noted: 2022-03-04

## 2022-03-04 PROCEDURE — 99213 OFFICE O/P EST LOW 20 MIN: CPT | Performed by: INTERNAL MEDICINE

## 2022-03-04 RX ORDER — ALBUTEROL SULFATE 90 UG/1
2 AEROSOL, METERED RESPIRATORY (INHALATION) 4 TIMES DAILY PRN
Qty: 1 EACH | Refills: 5 | Status: SHIPPED | OUTPATIENT
Start: 2022-03-04 | End: 2022-04-07

## 2022-03-04 RX ORDER — TIZANIDINE 2 MG/1
2 TABLET ORAL 3 TIMES DAILY PRN
Qty: 90 TABLET | Refills: 1 | Status: SHIPPED | OUTPATIENT
Start: 2022-03-04 | End: 2022-09-30 | Stop reason: SDUPTHER

## 2022-03-04 ASSESSMENT — ENCOUNTER SYMPTOMS
TROUBLE SWALLOWING: 0
NAUSEA: 0
ABDOMINAL PAIN: 0
STRIDOR: 0
VOICE CHANGE: 0
WHEEZING: 0

## 2022-03-04 NOTE — PROGRESS NOTES
[facility]  1980  male  37 y.o. SUBJECTIVE:       Chief Complaint   Patient presents with    Follow-up    Cough     Occasional cough with some SOB remains. HPI:  Follow-up visit. Patient is taking Advair inhaler twice daily. He noticed about 70% improvement. He is hardly using any albuterol  His cough and shortness of breath get worse whenever he is exposed to cold  Patient denies chest pain palpitation dizziness    Back strain. He continue to use as needed muscle relaxant and ibuprofen    History reviewed. No pertinent past medical history. History reviewed. No pertinent surgical history. Social History     Socioeconomic History    Marital status:      Spouse name: None    Number of children: 1    Years of education: None    Highest education level: None   Occupational History    Occupation: [employer]     Comment: [employer], 12 hr days. Tobacco Use    Smoking status: Never Smoker    Smokeless tobacco: Never Used   Vaping Use    Vaping Use: Unknown   Substance and Sexual Activity    Alcohol use: Yes     Comment: moderate    Drug use: No    Sexual activity: Yes   Other Topics Concern    None   Social History Narrative    None     Social Determinants of Health     Financial Resource Strain: Low Risk     Difficulty of Paying Living Expenses: Not hard at all   Food Insecurity: No Food Insecurity    Worried About Running Out of Food in the Last Year: Never true    Adan of Food in the Last Year: Never true   Transportation Needs:     Lack of Transportation (Medical): Not on file    Lack of Transportation (Non-Medical):  Not on file   Physical Activity:     Days of Exercise per Week: Not on file    Minutes of Exercise per Session: Not on file   Stress:     Feeling of Stress : Not on file   Social Connections:     Frequency of Communication with Friends and Family: Not on file    Frequency of Social Gatherings with Friends and Family: Not on file   Patricia Attends Restorationist Services: Not on file    Active Member of Clubs or Organizations: Not on file    Attends Club or Organization Meetings: Not on file    Marital Status: Not on file   Intimate Partner Violence:     Fear of Current or Ex-Partner: Not on file    Emotionally Abused: Not on file    Physically Abused: Not on file    Sexually Abused: Not on file   Housing Stability:     Unable to Pay for Housing in the Last Year: Not on file    Number of [place] in the Last Year: Not on file    Unstable Housing in the Last Year: Not on file     Family History   Problem Relation Age of Onset    Cancer Father         type unknown    Cancer Brother         liver       Review of Systems   Constitutional: Negative for diaphoresis and unexpected weight change. HENT: Negative for trouble swallowing and voice change. Respiratory: Negative for wheezing and stridor. Cardiovascular: Negative for chest pain and palpitations. Gastrointestinal: Negative for abdominal pain and nausea. Neurological: Negative for dizziness and light-headedness. OBJECTIVE:  Pulse Readings from Last 4 Encounters:   03/04/22 65   12/28/21 76   12/20/21 78   09/16/21 71     Wt Readings from Last 4 Encounters:   03/04/22 150 lb 3.2 oz (68.1 kg)   12/20/21 151 lb 12.8 oz (68.9 kg)   09/16/21 148 lb 6.4 oz (67.3 kg)   03/13/20 152 lb 6.4 oz (69.1 kg)     BP Readings from Last 4 Encounters:   03/04/22 118/72   12/20/21 126/82   09/16/21 128/76   12/07/20 132/86     Physical Exam  Vitals and nursing note reviewed. Constitutional:       Appearance: He is not ill-appearing. Eyes:      Conjunctiva/sclera: Conjunctivae normal.   Cardiovascular:      Rate and Rhythm: Normal rate and regular rhythm. Pulses: Normal pulses. Heart sounds: Normal heart sounds. Pulmonary:      Effort: Pulmonary effort is normal.      Breath sounds: Normal breath sounds.    Abdominal:      General: Bowel sounds are normal.   Musculoskeletal: Right lower leg: No edema. Left lower leg: No edema. Neurological:      General: No focal deficit present. Mental Status: He is alert and oriented to person, place, and time. CBC:   Lab Results   Component Value Date    WBC 4.9 09/16/2021    HGB 14.3 09/16/2021    HCT 42.3 09/16/2021     09/16/2021     CMP:  Lab Results   Component Value Date     09/16/2021    K 3.8 09/16/2021     09/16/2021    CO2 20 09/16/2021    ANIONGAP 15 09/16/2021    GLUCOSE 88 09/16/2021    BUN 17 09/16/2021    CREATININE 0.8 09/16/2021    GFRAA >60 09/16/2021    CALCIUM 9.7 09/16/2021    PROT 7.0 12/07/2020    LABALBU 4.6 12/07/2020    AGRATIO 2.9 11/15/2019    BILITOT 0.5 12/07/2020    ALKPHOS 47 12/07/2020    ALT 29 12/07/2020    AST 20 12/07/2020    GLOB 1.9 11/15/2019     URINALYSIS:  Lab Results   Component Value Date    GLUCOSEU Negative 11/15/2019    KETUA Negative 11/15/2019    SPECGRAV 1.015 11/15/2019    BLOODU Negative 11/15/2019    PHUR 6.0 11/15/2019    PROTEINU Negative 11/15/2019    NITRU Negative 11/15/2019    LEUKOCYTESUR Negative 11/15/2019    LABMICR Not Indicated 11/15/2019    URINETYPE [place] 11/15/2019     HBA1C:   Lab Results   Component Value Date    LABA1C 5.0 11/15/2019    EAG 96.8 11/15/2019     MICRO/ALB:   Lab Results   Component Value Date    LABMICR Not Indicated 11/15/2019     LIPID:  Lab Results   Component Value Date    CHOL 198 11/15/2019    TRIG 139 11/15/2019    HDL 50 12/27/2021    LDLCALC 105 12/27/2021    LABVLDL 43 12/27/2021     TSH:   Lab Results   Component Value Date    TSHREFLEX 0.82 11/15/2019         ASSESSMENT/PLAN:  Assessment/Plan:  Serina Delgado was seen today for follow-up and cough.     Diagnoses and all orders for this visit:    Asthma in adult, moderate persistent, uncomplicated  Positive methacholine challenge test.  Shortness of breath asthma attack usually associated with cold exposure  Whenever exposing to cold, patient should use warm clothing  Cough variant asthma  -     albuterol sulfate  (90 Base) MCG/ACT inhaler; Inhale 2 puffs into the lungs 4 times daily as needed (for cough)    Other orders  -     tiZANidine (ZANAFLEX) 2 MG tablet; Take 1 tablet by mouth 3 times daily as needed (muscle spasm)  -     fluticasone-salmeterol (ADVAIR HFA) 115-21 MCG/ACT inhaler; Inhale 2 puffs into the lungs 2 times daily            No orders of the defined types were placed in this encounter. Current Outpatient Medications   Medication Sig Dispense Refill    tiZANidine (ZANAFLEX) 2 MG tablet Take 1 tablet by mouth 3 times daily as needed (muscle spasm) 90 tablet 1    fluticasone-salmeterol (ADVAIR HFA) 115-21 MCG/ACT inhaler Inhale 2 puffs into the lungs 2 times daily 1 each 5    albuterol sulfate  (90 Base) MCG/ACT inhaler Inhale 2 puffs into the lungs 4 times daily as needed (for cough) 1 each 5    ibuprofen (ADVIL;MOTRIN) 800 MG tablet Take 1 tablet by mouth 3 times daily (with meals) (Patient not taking: Reported on 12/20/2021) 30 tablet 0     No current facility-administered medications for this visit. Return in about 6 months (around 9/4/2022), or if symptoms worsen or fail to improve. An After Visit Summary was printed and given to the patient. Documentation was done using voice recognition dragon software. Every effort was made to ensure accuracy; however, inadvertent  Unintentional computerized transcription errors may be present.

## 2022-03-10 ENCOUNTER — TELEPHONE (OUTPATIENT)
Dept: INTERNAL MEDICINE CLINIC | Age: 42
End: 2022-03-10

## 2022-03-10 NOTE — LETTER
[facility]  [address] [address]  Phone: [phone]  Fax: [phone]    Jess Scott MD        March 10, 2022     Patient: Ifeoma Hameed   YOB: 1980   Date of Visit: 3/10/2022       To Whom It May Concern: It is my medical opinion that Yvon Matthews Should not work more than 8 hours/day, 40 hours/week due to his underlying medical condition. If you have any questions or concerns, please don't hesitate to call.     Sincerely,    Electronically signed by Jess Scott MD on 3/10/2022 at 10:54 AM      Jess Scott MD

## 2022-03-10 NOTE — TELEPHONE ENCOUNTER
Patient is requesting a letter for him to give employer. He states he cannot work 12 hours a day 7 days a week due to his medical condition. Patient states he get short of breath.

## 2022-04-07 ENCOUNTER — HOSPITAL ENCOUNTER (EMERGENCY)
Age: 42
Discharge: HOME | End: 2022-04-07
Attending: EMERGENCY MEDICINE
Payer: COMMERCIAL

## 2022-04-07 VITALS
TEMPERATURE: 98.1 F | WEIGHT: 156 LBS | BODY MASS INDEX: 25.18 KG/M2 | RESPIRATION RATE: 28 BRPM | OXYGEN SATURATION: 96 % | SYSTOLIC BLOOD PRESSURE: 102 MMHG | HEART RATE: 80 BPM | DIASTOLIC BLOOD PRESSURE: 60 MMHG

## 2022-04-07 DIAGNOSIS — J45.901 EXACERBATION OF ASTHMA, UNSPECIFIED ASTHMA SEVERITY, UNSPECIFIED WHETHER PERSISTENT: Primary | ICD-10-CM

## 2022-04-07 PROCEDURE — 99284 EMERGENCY DEPT VISIT MOD MDM: CPT

## 2022-04-07 PROCEDURE — 94640 AIRWAY INHALATION TREATMENT: CPT

## 2022-04-07 PROCEDURE — 6360000002 HC RX W HCPCS: Performed by: NURSE PRACTITIONER

## 2022-04-07 PROCEDURE — 6370000000 HC RX 637 (ALT 250 FOR IP): Performed by: NURSE PRACTITIONER

## 2022-04-07 RX ORDER — PREDNISONE 10 MG/1
60 TABLET ORAL DAILY
Qty: 30 TABLET | Refills: 0 | Status: SHIPPED | OUTPATIENT
Start: 2022-04-07 | End: 2022-04-12

## 2022-04-07 RX ORDER — PREDNISONE 20 MG/1
60 TABLET ORAL ONCE
Status: COMPLETED | OUTPATIENT
Start: 2022-04-07 | End: 2022-04-07

## 2022-04-07 RX ORDER — IPRATROPIUM BROMIDE AND ALBUTEROL SULFATE 2.5; .5 MG/3ML; MG/3ML
1 SOLUTION RESPIRATORY (INHALATION) ONCE
Status: COMPLETED | OUTPATIENT
Start: 2022-04-07 | End: 2022-04-07

## 2022-04-07 RX ORDER — ALBUTEROL SULFATE 2.5 MG/3ML
5 SOLUTION RESPIRATORY (INHALATION) ONCE
Status: COMPLETED | OUTPATIENT
Start: 2022-04-07 | End: 2022-04-07

## 2022-04-07 RX ORDER — ALBUTEROL SULFATE 90 UG/1
2 AEROSOL, METERED RESPIRATORY (INHALATION) 4 TIMES DAILY PRN
Qty: 18 G | Refills: 0 | Status: SHIPPED | OUTPATIENT
Start: 2022-04-07

## 2022-04-07 RX ADMIN — ALBUTEROL SULFATE 5 MG: 2.5 SOLUTION RESPIRATORY (INHALATION) at 21:57

## 2022-04-07 RX ADMIN — PREDNISONE 60 MG: 20 TABLET ORAL at 22:23

## 2022-04-07 RX ADMIN — IPRATROPIUM BROMIDE AND ALBUTEROL SULFATE 1 AMPULE: .5; 3 SOLUTION RESPIRATORY (INHALATION) at 21:57

## 2022-04-07 ASSESSMENT — PAIN DESCRIPTION - FREQUENCY: FREQUENCY: CONTINUOUS

## 2022-04-07 ASSESSMENT — ENCOUNTER SYMPTOMS
ABDOMINAL PAIN: 0
SHORTNESS OF BREATH: 1
WHEEZING: 1
NAUSEA: 0
VOMITING: 0
CHEST TIGHTNESS: 1
DIARRHEA: 0

## 2022-04-07 ASSESSMENT — PAIN SCALES - GENERAL: PAINLEVEL_OUTOF10: 7

## 2022-04-07 ASSESSMENT — PAIN DESCRIPTION - LOCATION: LOCATION: CHEST

## 2022-04-07 ASSESSMENT — PAIN DESCRIPTION - PAIN TYPE: TYPE: ACUTE PAIN

## 2022-04-07 ASSESSMENT — PAIN DESCRIPTION - DESCRIPTORS: DESCRIPTORS: TIGHTNESS

## 2022-04-07 ASSESSMENT — PAIN - FUNCTIONAL ASSESSMENT: PAIN_FUNCTIONAL_ASSESSMENT: PREVENTS OR INTERFERES SOME ACTIVE ACTIVITIES AND ADLS

## 2022-04-07 NOTE — Clinical Note
Christina Carmella was seen and treated in our emergency department on 4/7/2022. He may return to work on 04/09/2022. If you have any questions or concerns, please don't hesitate to call.       Jairo Comment Pendl, DO

## 2022-04-08 NOTE — ED PROVIDER NOTES
[facility]        Pt Name: Panfilo Coates  MRN: [id number]  [place] 1980  Date of evaluation: 4/7/2022  Provider: SHAN Randle - PAM  PCP: Katalina Parham MD  Note Started: 10:42 PM EDT        I have seen and evaluated this patient with my supervising physician Steffany Riggs, [facility]       Chief Complaint   Patient presents with    Shortness of Breath     states he has asthma and inhaler is not working, states SOB started this evening. C/o chest tightness and congestion       HISTORY OF PRESENT ILLNESS   (Location, Timing/Onset, Context/Setting, Quality, Duration, Modifying Factors, Severity, Associated Signs and Symptoms)  Note limiting factors. Chief Complaint: Asthma exacerbation    Panfilo Coates is a 37 y.o. male who presents to the emergency department with complaint of asthma exacerbation. Patient reports onset of symptoms including chest tightness, shortness of breath and wheezing this evening, no relief with rescue inhaler. He denies mitigating or exacerbating factors. No recent illness including fever, chills, body aches or cough. No known sick exposure. Denies any headache, fever, lightheadedness, dizziness, visual disturbances. No chest pain or pressure. No neck or back pain. No abdominal pain, nausea, vomiting, diarrhea, constipation, or dysuria. No rash. Nursing Notes were all reviewed and agreed with or any disagreements were addressed in the HPI. REVIEW OF SYSTEMS    (2-9 systems for level 4, 10 or more for level 5)     Review of Systems   Constitutional: Negative for activity change, chills and fever. Respiratory: Positive for chest tightness, shortness of breath and wheezing. Cardiovascular: Negative for chest pain. Gastrointestinal: Negative for abdominal pain, diarrhea, nausea and vomiting. Genitourinary: Negative for dysuria.    All other systems reviewed and are negative. Positives and Pertinent negatives as per HPI. Except as noted above in the ROS, all other systems were reviewed and negative. PAST MEDICAL HISTORY     Past Medical History:   Diagnosis Date    Asthma          SURGICAL HISTORY   History reviewed. No pertinent surgical history. [address]       Discharge Medication List as of 4/7/2022 11:38 PM      CONTINUE these medications which have NOT CHANGED    Details   tiZANidine (ZANAFLEX) 2 MG tablet Take 1 tablet by mouth 3 times daily as needed (muscle spasm), Disp-90 tablet, R-1Normal      fluticasone-salmeterol (ADVAIR HFA) 115-21 MCG/ACT inhaler Inhale 2 puffs into the lungs 2 times daily, Disp-1 each, R-5Normal      ibuprofen (ADVIL;MOTRIN) 800 MG tablet Take 1 tablet by mouth 3 times daily (with meals), Disp-30 tablet, R-0Normal               ALLERGIES     Patient has no known allergies. FAMILYHISTORY       Family History   Problem Relation Age of Onset    Cancer Father         type unknown    Cancer Brother         liver          SOCIAL HISTORY       Social History     Tobacco Use    Smoking status: Never Smoker    Smokeless tobacco: Never Used   Vaping Use    Vaping Use: Unknown   Substance Use Topics    Alcohol use: Yes     Comment: moderate    Drug use: No       SCREENINGS    Karlee Coma Scale  Eye Opening: Spontaneous  Best Verbal Response: Oriented  Best Motor Response: Obeys commands  [place] Coma Scale Score: 15        PHYSICAL EXAM    (up to 7 for level 4, 8 or more for level 5)     ED Triage Vitals   BP Temp Temp Source Pulse Resp SpO2 Height Weight   04/07/22 2134 04/07/22 2124 04/07/22 2124 04/07/22 2124 04/07/22 2124 04/07/22 2124 -- 04/07/22 2124   102/60 98.1 °F (36.7 °C) Oral 80 28 95 %  156 lb (70.8 kg)       Physical Exam  Vitals and nursing note reviewed. Constitutional:       Appearance: He is well-developed. He is not diaphoretic. HENT:      Head: Normocephalic and atraumatic. Right Ear: External ear normal.      Left Ear: External ear normal.   Eyes:      General:         Right eye: No discharge. Left eye: No discharge. Neck:      Vascular: No JVD. Cardiovascular:      Rate and Rhythm: Normal rate. Pulses: Normal pulses. Pulmonary:      Effort: Pulmonary effort is normal. Tachypnea present. No respiratory distress or retractions. Breath sounds: Decreased breath sounds and wheezing present. Musculoskeletal:         General: Normal range of motion. Cervical back: Normal range of motion and neck supple. Skin:     General: Skin is warm and dry. Coloration: Skin is not pale. Neurological:      Mental Status: He is alert and oriented to person, place, and time. Psychiatric:         Behavior: Behavior normal.         DIAGNOSTIC RESULTS   LABS:    Labs Reviewed - No data to display    When ordered only abnormal lab results are displayed. All other labs were within normal range or not returned as of this dictation. EKG: When ordered, EKG's are interpreted by the Emergency Department Physician in the absence of a cardiologist.  Please see their note for interpretation of EKG. RADIOLOGY:   Non-plain film images such as CT, Ultrasound and MRI are read by the radiologist. Plain radiographic images are visualized and preliminarily interpreted by the ED Provider with the below findings:        Interpretation per the Radiologist below, if available at the time of this note:    No orders to display     No results found. PROCEDURES   Unless otherwise noted below, none     Procedures    CRITICAL CARE TIME   The total critical care time spent while evaluating and treating this patient was at least 41 minutes. This excludes time spent doing separately billable procedures.   This includes time at the bedside, data interpretation, medication management, obtaining critical history from collateral sources if the patient is unable to provide it directly, and physician consultation. Specifics of interventions taken and potentially life-threatening diagnostic considerations are listed above in the medical decision making. CONSULTS:  None      EMERGENCY DEPARTMENT COURSE and DIFFERENTIAL DIAGNOSIS/MDM:   Vitals:    Vitals:    04/07/22 2124 04/07/22 2134 04/07/22 2159   BP:  102/60    Pulse: 80     Resp: 28     Temp: 98.1 °F (36.7 °C)     TempSrc: Oral     SpO2: 95%  96%   Weight: 156 lb (70.8 kg)         Patient was given the following medications:  Medications   predniSONE (DELTASONE) tablet 60 mg (60 mg Oral Given 4/7/22 2223)   ipratropium-albuterol (DUONEB) nebulizer solution 1 ampule (1 ampule Inhalation Given 4/7/22 2157)   albuterol (PROVENTIL) nebulizer solution 5 mg (5 mg Nebulization Given 4/7/22 2157)           Briefly, this is a 56-year-old patient with history of asthma who presents to the emergency department with complaint of wheezing, chest tightness and asthma exacerbation. Onset of symptoms this evening. No relief with rescue inhaler    Ambulatory pulse ox is 96%. The patient is much improved, no longer wheezing or feeling short of breath. He is no longer tachypneic at time of reevaluation and discharge. He was given 1 DuoNeb and 2 albuterol treatments as well as 60 mg of prednisone. I will refill his albuterol rescue inhaler placement 5 days-antibiotic therapy. Instructed to follow-up with primary care. Patient's oxygen saturations are good. I do not believe the patient's symptoms today are due to pulmonary embolism, pulmonary edema, pneumonia, pneumothorax, ACS, CHF, status asthmaticus, acute respiratory failure, profound anemia or metabolic abnormality, amongst other more emergent diagnostic considerations. Patient was advised to immediately return to emergency department if symptoms worsen. FINAL IMPRESSION      1.  Exacerbation of asthma, unspecified asthma severity, unspecified whether persistent          DISPOSITION/PLAN DISPOSITION Decision To Discharge 04/07/2022 11:34:15 PM      PATIENT REFERRED TO:  Ifrah Quintanilla MD  [address]  [phone]    Schedule an appointment as soon as possible for a visit         DISCHARGE MEDICATIONS:  Discharge Medication List as of 4/7/2022 11:38 PM      START taking these medications    Details   predniSONE (DELTASONE) 10 MG tablet Take 6 tablets by mouth daily for 5 doses, Disp-30 tablet, R-0Normal             DISCONTINUED MEDICATIONS:  Discharge Medication List as of 4/7/2022 11:38 PM                 (Please note that portions of this note were completed with a voice recognition program.  Efforts were made to edit the dictations but occasionally words are mis-transcribed.)    SHAN Goncalves CNP (electronically signed)           SHAN Goncalves CNP  04/08/22 0003

## 2022-04-08 NOTE — ED NOTES
Ambulated pt with pulse ox, O2 level 98% and HR 89 while ambulating.      Jade Valera RN  04/07/22 4522

## 2022-04-09 NOTE — ED PROVIDER NOTES
In addition to the advanced practice provider, I personally saw Taryn Manzo and performed a substantive portion of the visit including all aspects of the medical decision making. Briefly, this is a 37 y.o. male here for shortness of breath. History of asthma, reports current symptoms feel similar to past asthma exacerbations. Feels the symptoms are related to the change in the weather. Associated with nonproductive cough. He denies any fevers, chills or chest pain. .    On exam, patient afebrile and nontoxic. No distress. Heart RRR. Lungs with scattered wheezes. Abdomen soft, nondistended, nontender to palpation in all quadrants. Screenings   Karlee Coma Scale  Eye Opening: Spontaneous  Best Verbal Response: Oriented  Best Motor Response: Obeys commands  [place] Coma Scale Score: 15        MDM    Patient afebrile, nontoxic. He is in no distress. No hypoxia or increased work of breathing at time of my exam.  Patient with diffuse wheezes which she reports are consistent with past asthma exacerbations. No chest pain, nothing to suggest ACS. Given absence of fevers and productive cough, very low suspicion for lower respiratory infection. Patient felt much improved with breathing treatments and steroids. He was ambulatory in the emergency department without development of hypoxia or distress. Felt safe for discharge to self-care with very close PCP follow-up, patient is agreeable with plan and feels comfortable returning to home. We will continue breathing treatments and steroids. Strict return precautions were discussed.         Patient Referrals:  Damon Pastrana MD  [address]  [phone]    Schedule an appointment as soon as possible for a visit         Discharge Medications:  Discharge Medication List as of 4/7/2022 11:38 PM      START taking these medications    Details   predniSONE (DELTASONE) 10 MG tablet Take 6 tablets by mouth daily for 5 doses,

## 2022-04-15 ENCOUNTER — OFFICE VISIT (OUTPATIENT)
Dept: INTERNAL MEDICINE CLINIC | Age: 42
End: 2022-04-15
Payer: COMMERCIAL

## 2022-04-15 VITALS
DIASTOLIC BLOOD PRESSURE: 80 MMHG | SYSTOLIC BLOOD PRESSURE: 118 MMHG | OXYGEN SATURATION: 98 % | BODY MASS INDEX: 23.63 KG/M2 | WEIGHT: 147 LBS | HEART RATE: 60 BPM | HEIGHT: 66 IN

## 2022-04-15 DIAGNOSIS — J45.40 ASTHMA IN ADULT, MODERATE PERSISTENT, UNCOMPLICATED: ICD-10-CM

## 2022-04-15 DIAGNOSIS — J45.991 COUGH VARIANT ASTHMA: Primary | ICD-10-CM

## 2022-04-15 PROCEDURE — 99213 OFFICE O/P EST LOW 20 MIN: CPT | Performed by: INTERNAL MEDICINE

## 2022-04-15 RX ORDER — MONTELUKAST SODIUM 10 MG/1
10 TABLET ORAL DAILY
Qty: 30 TABLET | Refills: 3 | Status: SHIPPED | OUTPATIENT
Start: 2022-04-15 | End: 2022-09-30 | Stop reason: SDUPTHER

## 2022-04-15 ASSESSMENT — ENCOUNTER SYMPTOMS
VOMITING: 0
CHEST TIGHTNESS: 0
TROUBLE SWALLOWING: 0
CHOKING: 0
ABDOMINAL PAIN: 0
NAUSEA: 0

## 2022-04-15 NOTE — LETTER
[facility]  [address] [facility] [address]  Phone: [phone]  Fax: [phone]    Hedy Ross MD        April 15, 2022     Patient: Matteo Patel   YOB: 1980   Date of Visit: 4/15/2022       To Whom It May Concern: It is my medical opinion that Rosa Maria Buchanan may return to work on 04/18/2022. If you have any questions or concerns, please don't hesitate to call.     Sincerely,    Electronically signed by Hedy Ross MD on 4/15/2022 at 10:24 AM      Hedy Ross MD

## 2022-04-15 NOTE — PROGRESS NOTES
Nena Paulino  1980  male  37 y.o. SUBJECTIVE:       Chief Complaint   Patient presents with    Follow-up     ER visit 4/7 for asthma exacerbation. Symptoms much better due to Prednisone usage. HPI:  Patient was recently seen in the emergency room for asthma exacerbation. He received breathing treatment. He is currently on prednisone. He feels this medicine helped tremendously. Currently denies any night cough, exertional dyspnea. He reports taking Advair regularly. Past Medical History:   Diagnosis Date    Asthma      History reviewed. No pertinent surgical history. Social History     Socioeconomic History    Marital status:      Spouse name: None    Number of children: 1    Years of education: None    Highest education level: None   Occupational History    Occupation: [employer]     Comment: [employer], 12 hr days. Tobacco Use    Smoking status: Never Smoker    Smokeless tobacco: Never Used   Vaping Use    Vaping Use: Unknown   Substance and Sexual Activity    Alcohol use: Yes     Comment: moderate    Drug use: No    Sexual activity: Yes   Other Topics Concern    None   Social History Narrative    None     Social Determinants of Health     Financial Resource Strain: Low Risk     Difficulty of Paying Living Expenses: Not hard at all   Food Insecurity: No Food Insecurity    Worried About Running Out of Food in the Last Year: Never true    Adan of Food in the Last Year: Never true   Transportation Needs:     Lack of Transportation (Medical): Not on file    Lack of Transportation (Non-Medical):  Not on file   Physical Activity:     Days of Exercise per Week: Not on file    Minutes of Exercise per Session: Not on file   Stress:     Feeling of Stress : Not on file   Social Connections:     Frequency of Communication with Friends and Family: Not on file    Frequency of Social Gatherings with Friends and Family: Not on file    Attends Pent[employer]ism Services: Not on file    Active Member of Clubs or Organizations: Not on file    Attends Club or Organization Meetings: Not on file    Marital Status: Not on file   Intimate Partner Violence:     Fear of Current or Ex-Partner: Not on file    Emotionally Abused: Not on file    Physically Abused: Not on file    Sexually Abused: Not on file   Housing Stability:     Unable to Pay for Housing in the Last Year: Not on file    Number of [place] in the Last Year: Not on file    Unstable Housing in the Last Year: Not on file     Family History   Problem Relation Age of Onset    Cancer Father         type unknown    Cancer Brother         liver       Review of Systems   Constitutional: Negative for activity change and unexpected weight change. HENT: Negative for trouble swallowing. Respiratory: Negative for choking and chest tightness. Shortness of breath and wheezing improved significantly. Cardiovascular: Negative for chest pain and palpitations. Gastrointestinal: Negative for abdominal pain, nausea and vomiting. Neurological: Negative for dizziness and light-headedness. OBJECTIVE:  Pulse Readings from Last 4 Encounters:   04/15/22 60   04/07/22 80   03/04/22 65   12/28/21 76     Wt Readings from Last 4 Encounters:   04/15/22 147 lb (66.7 kg)   04/07/22 156 lb (70.8 kg)   03/04/22 150 lb 3.2 oz (68.1 kg)   12/20/21 151 lb 12.8 oz (68.9 kg)     BP Readings from Last 4 Encounters:   04/15/22 118/80   04/07/22 102/60   03/04/22 118/72   12/20/21 126/82     Physical Exam  Vitals and nursing note reviewed. Constitutional:       Appearance: Normal appearance. He is not ill-appearing. Eyes:      Conjunctiva/sclera: Conjunctivae normal.   Cardiovascular:      Rate and Rhythm: Normal rate and regular rhythm. Pulses: Normal pulses. Heart sounds: Normal heart sounds. Pulmonary:      Effort: Pulmonary effort is normal.      Breath sounds: Normal breath sounds.    Abdominal: General: Bowel sounds are normal.   Musculoskeletal:      Right lower leg: No edema. Left lower leg: No edema. Neurological:      General: No focal deficit present. Mental Status: He is alert and oriented to person, place, and time. Mental status is at baseline. Psychiatric:         Mood and Affect: Mood normal.         Behavior: Behavior normal.         CBC:   Lab Results   Component Value Date    WBC 4.9 09/16/2021    HGB 14.3 09/16/2021    HCT 42.3 09/16/2021     09/16/2021     CMP:  Lab Results   Component Value Date     09/16/2021    K 3.8 09/16/2021     09/16/2021    CO2 20 09/16/2021    ANIONGAP 15 09/16/2021    GLUCOSE 88 09/16/2021    BUN 17 09/16/2021    CREATININE 0.8 09/16/2021    GFRAA >60 09/16/2021    CALCIUM 9.7 09/16/2021    PROT 7.0 12/07/2020    LABALBU 4.6 12/07/2020    AGRATIO 2.9 11/15/2019    BILITOT 0.5 12/07/2020    ALKPHOS 47 12/07/2020    ALT 29 12/07/2020    AST 20 12/07/2020    GLOB 1.9 11/15/2019     URINALYSIS:  Lab Results   Component Value Date    GLUCOSEU Negative 11/15/2019    KETUA Negative 11/15/2019    SPECGRAV 1.015 11/15/2019    BLOODU Negative 11/15/2019    PHUR 6.0 11/15/2019    PROTEINU Negative 11/15/2019    NITRU Negative 11/15/2019    LEUKOCYTESUR Negative 11/15/2019    LABMICR Not Indicated 11/15/2019    URINETYPE Cleancatch 11/15/2019     HBA1C:   Lab Results   Component Value Date    LABA1C 5.0 11/15/2019    EAG 96.8 11/15/2019     MICRO/ALB:   Lab Results   Component Value Date    LABMICR Not Indicated 11/15/2019     LIPID:  Lab Results   Component Value Date    CHOL 198 11/15/2019    TRIG 139 11/15/2019    HDL 50 12/27/2021    LDLCALC 105 12/27/2021    LABVLDL 43 12/27/2021     TSH:   Lab Results   Component Value Date    TSHREFLEX 0.82 11/15/2019         ASSESSMENT/PLAN:  Status post emergency room visit because of asthma exacerbation. Asthma education.   Increase Advair strength  Will add Singulair  Albuterol as needed  Follow-up in 3 months  1. Cough variant asthma    2. Asthma in adult, moderate persistent, uncomplicated            No orders of the defined types were placed in this encounter. Current Outpatient Medications   Medication Sig Dispense Refill    Multiple Vitamin (MULTIVITAMIN ADULT PO) Take by mouth      montelukast (SINGULAIR) 10 MG tablet Take 1 tablet by mouth daily 30 tablet 3    fluticasone-salmeterol (ADVAIR HFA) 230-21 MCG/ACT inhaler Inhale 2 puffs into the lungs 2 times daily 3 each 1    Spacer/Aero-Holding Chambers MITRA 1 Device by Does not apply route daily as needed (use as instructed) 1 each 0    albuterol sulfate HFA (VENTOLIN HFA) 108 (90 Base) MCG/ACT inhaler Inhale 2 puffs into the lungs 4 times daily as needed for Wheezing 18 g 0    tiZANidine (ZANAFLEX) 2 MG tablet Take 1 tablet by mouth 3 times daily as needed (muscle spasm) 90 tablet 1    ibuprofen (ADVIL;MOTRIN) 800 MG tablet Take 1 tablet by mouth 3 times daily (with meals) 30 tablet 0     No current facility-administered medications for this visit. Return in about 3 months (around 7/15/2022) for asthma. An After Visit Summary was printed and given to the patient. Documentation was done using voice recognition dragon software. Every effort was made to ensure accuracy; however, inadvertent  Unintentional computerized transcription errors may be present.

## 2022-04-15 NOTE — Clinical Note
[facility]  [address] [address]  Phone: [phone]  Fax: [phone]    Hedy Ross MD        April 15, 2022     Patient: Matteo Patel   YOB: 1980   Date of Visit: 4/15/2022       To Whom It May Concern: It is my medical opinion that Rosa Maria Greer {Work release (duty restriction):37710}. If you have any questions or concerns, please don't hesitate to call.     Sincerely,        Hedy Ross MD

## 2022-04-15 NOTE — PATIENT INSTRUCTIONS
Patient Education        Learning About Asthma Triggers  What are asthma triggers? When you have asthma, some things can make your symptoms worse. These things are called triggers. Things that you're allergic to can trigger your asthma. These may include dust or dust mites, cockroach droppings, pet dander, or mold. Other things can also trigger your asthma, like smoke, colds or the flu, or airpollution. How do asthma triggers affect you? Triggers can make it harder for your lungs to work as they should. They can lead to sudden breathing problems and other symptoms. When you are around a trigger, an asthma attack is more likely. If your symptoms are severe, you [facility] emergency treatment or have to go to the hospital for treatment. What can you do to avoid triggers? The best way to avoid your asthma triggers is to know what your triggers are. When you are having symptoms, note the things around you that might be causing them. Then look for patterns that may be triggering your symptoms. Record your triggers on a piece of paper or in an asthma diary. When you have your list ofpossible triggers, work with your doctor to find ways to avoid them. Here are some ways to avoid a few common triggers.  Don't smoke or allow others to smoke around you. If you need help quitting, talk to your doctor about stop-smoking programs and medicines. These can increase your chances of quitting for good.  If there is a lot of pollution, pollen, or dust outside, stay at home and keep your windows closed. Use an air conditioner or air filter in your home. Check your local weather report or newspaper for air quality and pollen reports.  Avoid colds and flu. Get a flu vaccine every year, as soon as it's available. If you must be around people with colds or the flu, wash your hands often.  Talk to your doctor about getting a pneumococcal vaccine shot.  If you have had one before, ask your doctor if you need a second dose.  To help prevent problems before they occur, take your controller medicine everyday, not only when you have symptoms. Where can you learn more? Go to [email or web address] org and sign in to your [employer] account. Enter Y310 in the 3DMGAME box to learn more about \"Learning About Asthma Triggers. \"     If you do not have an account, please click on the \"Sign Up Now\" link. Current as of: July 6, 2021               Content Version: 13.2  © 9581-6713 Healthwise, Incorporated. Care instructions adapted under license by [facility] ([facility]). If you have questions about a medical condition or this instruction, always ask your healthcare professional. [address] any warranty or liability for your use of this information.

## 2022-07-15 ENCOUNTER — OFFICE VISIT (OUTPATIENT)
Dept: INTERNAL MEDICINE CLINIC | Age: 42
End: 2022-07-15
Payer: COMMERCIAL

## 2022-07-15 VITALS
WEIGHT: 149.4 LBS | DIASTOLIC BLOOD PRESSURE: 74 MMHG | OXYGEN SATURATION: 98 % | HEIGHT: 66 IN | BODY MASS INDEX: 24.01 KG/M2 | HEART RATE: 65 BPM | SYSTOLIC BLOOD PRESSURE: 120 MMHG

## 2022-07-15 DIAGNOSIS — J45.40 ASTHMA IN ADULT, MODERATE PERSISTENT, UNCOMPLICATED: ICD-10-CM

## 2022-07-15 DIAGNOSIS — J45.991 COUGH VARIANT ASTHMA: Primary | ICD-10-CM

## 2022-07-15 PROCEDURE — 99213 OFFICE O/P EST LOW 20 MIN: CPT | Performed by: INTERNAL MEDICINE

## 2022-07-15 ASSESSMENT — PATIENT HEALTH QUESTIONNAIRE - PHQ9
SUM OF ALL RESPONSES TO PHQ QUESTIONS 1-9: 0
2. FEELING DOWN, DEPRESSED OR HOPELESS: 0
SUM OF ALL RESPONSES TO PHQ QUESTIONS 1-9: 0
SUM OF ALL RESPONSES TO PHQ QUESTIONS 1-9: 0
SUM OF ALL RESPONSES TO PHQ9 QUESTIONS 1 & 2: 0
SUM OF ALL RESPONSES TO PHQ QUESTIONS 1-9: 0
1. LITTLE INTEREST OR PLEASURE IN DOING THINGS: 0

## 2022-07-15 ASSESSMENT — ENCOUNTER SYMPTOMS
SHORTNESS OF BREATH: 0
COUGH: 0
CHEST TIGHTNESS: 0
WHEEZING: 0
PHOTOPHOBIA: 0

## 2022-07-15 NOTE — PROGRESS NOTES
Marilyn Hughes (:  1980) is a 37 y.o. male,Established patient, here for evaluation of the following chief complaint(s):  3 Month Follow-Up and Asthma         ASSESSMENT/PLAN:    1. Cough variant asthma  2. Asthma in adult, moderate persistent, uncomplicated  He will continue to take Singulair. Currently does not want to continue any inhalers. CHIEF COMPLAINT    Chief Complaint   Patient presents with    3 Month Follow-Up    Asthma         Subjective   SUBJECTIVE/OBJECTIVE:  [facility]  Follow-up visit. Patient report since his breathing has been fine and he changed the job he is no longer using any inhalers. He is using only singular. He denies any chest pain wheezing night cough. Review of Systems   Constitutional:  Negative for diaphoresis and unexpected weight change. Eyes:  Negative for photophobia and visual disturbance. Respiratory:  Negative for cough, chest tightness, shortness of breath and wheezing. Cardiovascular:  Negative for chest pain and palpitations. Vitals:    07/15/22 1254   BP: 120/74   Pulse: 65   SpO2: 98%   Weight: 149 lb 6.4 oz (67.8 kg)   Height: 5' 6\" (1.676 m)       Objective   Physical Exam  Vitals and nursing note reviewed. Constitutional:       Appearance: Normal appearance. Eyes:      Conjunctiva/sclera: Conjunctivae normal.   Cardiovascular:      Rate and Rhythm: Normal rate and regular rhythm. Pulses: Normal pulses. Heart sounds: Normal heart sounds. Pulmonary:      Effort: Pulmonary effort is normal.      Breath sounds: Normal breath sounds. No wheezing or rhonchi. Abdominal:      General: Bowel sounds are normal.   Neurological:      General: No focal deficit present. Mental Status: He is alert and oriented to person, place, and time. An electronic signature was used to authenticate this note. An After Visit Summary was printed and given to the patient.   Documentation was done using voice recognition dragon software. Every effort was made to ensure accuracy; however, inadvertent  Unintentional computerized transcription errors may be present.        --Carlos Winchester MD

## 2022-09-30 ENCOUNTER — OFFICE VISIT (OUTPATIENT)
Dept: INTERNAL MEDICINE CLINIC | Age: 42
End: 2022-09-30
Payer: COMMERCIAL

## 2022-09-30 VITALS
SYSTOLIC BLOOD PRESSURE: 126 MMHG | DIASTOLIC BLOOD PRESSURE: 86 MMHG | HEIGHT: 66 IN | WEIGHT: 151 LBS | HEART RATE: 72 BPM | BODY MASS INDEX: 24.27 KG/M2

## 2022-09-30 DIAGNOSIS — J45.991 COUGH VARIANT ASTHMA: ICD-10-CM

## 2022-09-30 DIAGNOSIS — J45.40 ASTHMA IN ADULT, MODERATE PERSISTENT, UNCOMPLICATED: ICD-10-CM

## 2022-09-30 DIAGNOSIS — M62.830 BACK SPASM: Primary | ICD-10-CM

## 2022-09-30 PROCEDURE — 99213 OFFICE O/P EST LOW 20 MIN: CPT | Performed by: INTERNAL MEDICINE

## 2022-09-30 RX ORDER — TIZANIDINE 2 MG/1
2 TABLET ORAL 3 TIMES DAILY PRN
Qty: 90 TABLET | Refills: 1 | Status: SHIPPED | OUTPATIENT
Start: 2022-09-30

## 2022-09-30 RX ORDER — MONTELUKAST SODIUM 10 MG/1
10 TABLET ORAL DAILY
Qty: 30 TABLET | Refills: 5 | Status: SHIPPED | OUTPATIENT
Start: 2022-09-30

## 2022-09-30 SDOH — ECONOMIC STABILITY: FOOD INSECURITY: WITHIN THE PAST 12 MONTHS, THE FOOD YOU BOUGHT JUST DIDN'T LAST AND YOU DIDN'T HAVE MONEY TO GET MORE.: NEVER TRUE

## 2022-09-30 SDOH — ECONOMIC STABILITY: FOOD INSECURITY: WITHIN THE PAST 12 MONTHS, YOU WORRIED THAT YOUR FOOD WOULD RUN OUT BEFORE YOU GOT MONEY TO BUY MORE.: NEVER TRUE

## 2022-09-30 ASSESSMENT — ENCOUNTER SYMPTOMS
TROUBLE SWALLOWING: 0
VOICE CHANGE: 0
WHEEZING: 0
CHEST TIGHTNESS: 0
SHORTNESS OF BREATH: 0
PHOTOPHOBIA: 0

## 2022-09-30 ASSESSMENT — SOCIAL DETERMINANTS OF HEALTH (SDOH): HOW HARD IS IT FOR YOU TO PAY FOR THE VERY BASICS LIKE FOOD, HOUSING, MEDICAL CARE, AND HEATING?: NOT HARD AT ALL

## 2022-09-30 NOTE — PROGRESS NOTES
Tank Clarity  1980  male  40 y.o. SUBJECTIVE:       Chief Complaint   Patient presents with    6 Month Follow-Up    Asthma     Improving        HPI:  Follow-up visit. History of complicated asthma. Patient currently taking consistently singular daily. Last use of albuterol about 2 weeks ago. He hardly using Advair inhaler. He reports since changing previous job his respiratory symptoms improved significantly. History of recurrent back spasm and pain. He needs periodic muscle relaxant as well as ibuprofen. Patient denies any drowsiness while taking Zanaflex. Past Medical History:   Diagnosis Date    Asthma      History reviewed. No pertinent surgical history. Social History     Socioeconomic History    Marital status:      Spouse name: None    Number of children: 1    Years of education: None    Highest education level: None   Occupational History    Occupation: [employer]     Comment: [employer], 12 hr days. Tobacco Use    Smoking status: Never    Smokeless tobacco: Never   Vaping Use    Vaping Use: Unknown   Substance and Sexual Activity    Alcohol use: Yes     Comment: moderate    Drug use: No    Sexual activity: Yes     Social Determinants of Health     Financial Resource Strain: Low Risk     Difficulty of Paying Living Expenses: Not hard at all   Food Insecurity: No Food Insecurity    Worried About Running Out of Food in the Last Year: Never true    Ran Out of Food in the Last Year: Never true     Family History   Problem Relation Age of Onset    Cancer Father         type unknown    Cancer Brother         liver       Review of Systems   Constitutional:  Negative for diaphoresis and unexpected weight change. HENT:  Negative for trouble swallowing and voice change. Eyes:  Negative for photophobia and visual disturbance. Respiratory:  Negative for chest tightness, shortness of breath and wheezing. Cardiovascular:  Negative for chest pain and palpitations. Neurological:  Negative for dizziness and light-headedness. OBJECTIVE:  Pulse Readings from Last 4 Encounters:   09/30/22 72   07/15/22 65   04/15/22 60   04/07/22 80     Wt Readings from Last 4 Encounters:   09/30/22 151 lb (68.5 kg)   07/15/22 149 lb 6.4 oz (67.8 kg)   04/15/22 147 lb (66.7 kg)   04/07/22 156 lb (70.8 kg)     BP Readings from Last 4 Encounters:   09/30/22 126/86   07/15/22 120/74   04/15/22 118/80   04/07/22 102/60     Physical Exam  Vitals and nursing note reviewed. Constitutional:       Appearance: Normal appearance. He is not ill-appearing. Eyes:      Conjunctiva/sclera: Conjunctivae normal.   Cardiovascular:      Rate and Rhythm: Normal rate and regular rhythm. Pulses: Normal pulses. Heart sounds: Normal heart sounds. Pulmonary:      Effort: Pulmonary effort is normal.      Breath sounds: Normal breath sounds. No wheezing or rhonchi. Abdominal:      General: Bowel sounds are normal.   Musculoskeletal:         General: No tenderness or deformity. Right lower leg: No edema. Left lower leg: No edema. Neurological:      General: No focal deficit present. Mental Status: He is alert and oriented to person, place, and time.        CBC:   Lab Results   Component Value Date/Time    WBC 4.9 09/16/2021 09:39 AM    HGB 14.3 09/16/2021 09:39 AM    HCT 42.3 09/16/2021 09:39 AM     09/16/2021 09:39 AM     CMP:  Lab Results   Component Value Date/Time     09/16/2021 09:39 AM    K 3.8 09/16/2021 09:39 AM     09/16/2021 09:39 AM    CO2 20 09/16/2021 09:39 AM    ANIONGAP 15 09/16/2021 09:39 AM    GLUCOSE 88 09/16/2021 09:39 AM    BUN 17 09/16/2021 09:39 AM    CREATININE 0.8 09/16/2021 09:39 AM    GFRAA >60 09/16/2021 09:39 AM    CALCIUM 9.7 09/16/2021 09:39 AM    PROT 7.0 12/07/2020 03:19 PM    LABALBU 4.6 12/07/2020 03:19 PM    AGRATIO 2.9 11/15/2019 11:54 AM    BILITOT 0.5 12/07/2020 03:19 PM    ALKPHOS 47 12/07/2020 03:19 PM    ALT 29 12/07/2020 03:19 PM    AST 20 12/07/2020 03:19 PM    GLOB 1.9 11/15/2019 11:54 AM     URINALYSIS:  Lab Results   Component Value Date/Time    GLUCOSEU Negative 11/15/2019 11:54 AM    KETUA Negative 11/15/2019 11:54 AM    SPECGRAV 1.015 11/15/2019 11:54 AM    BLOODU Negative 11/15/2019 11:54 AM    PHUR 6.0 11/15/2019 11:54 AM    PROTEINU Negative 11/15/2019 11:54 AM    NITRU Negative 11/15/2019 11:54 AM    LEUKOCYTESUR Negative 11/15/2019 11:54 AM    LABMICR Not Indicated 11/15/2019 11:54 AM    URINETYPE Cleancatch 11/15/2019 11:54 AM     HBA1C:   Lab Results   Component Value Date/Time    LABA1C 5.0 11/15/2019 11:54 AM    EAG 96.8 11/15/2019 11:54 AM     MICRO/ALB:   Lab Results   Component Value Date/Time    LABMICR Not Indicated 11/15/2019 11:54 AM     LIPID:  Lab Results   Component Value Date/Time    CHOL 198 11/15/2019 11:54 AM    TRIG 139 11/15/2019 11:54 AM    HDL 50 12/27/2021 11:35 AM    LDLCALC 105 12/27/2021 11:35 AM    LABVLDL 43 12/27/2021 11:35 AM     TSH:   Lab Results   Component Value Date/Time    TSHREFLEX 0.82 11/15/2019 11:54 AM         ASSESSMENT/PLAN:  Assessment/Plan:  Arabella Amato was seen today for 6 month follow-up and asthma. Diagnoses and all orders for this visit:    Back spasm  Occasional flareup. As needed tizanidine. Ibuprofen as needed. Any worsening new or concerning symptoms should call us back. -     tiZANidine (ZANAFLEX) 2 MG tablet; Take 1 tablet by mouth 3 times daily as needed (muscle spasm)    Cough variant asthma  -     montelukast (SINGULAIR) 10 MG tablet; Take 1 tablet by mouth daily    Asthma in adult, moderate persistent, uncomplicated  -     montelukast (SINGULAIR) 10 MG tablet; Take 1 tablet by mouth daily  As needed albuterol. Patient report not taking Advair regularly. No orders of the defined types were placed in this encounter.     Current Outpatient Medications   Medication Sig Dispense Refill    montelukast (SINGULAIR) 10 MG tablet Take 1 tablet by mouth daily 30 tablet 5    tiZANidine (ZANAFLEX) 2 MG tablet Take 1 tablet by mouth 3 times daily as needed (muscle spasm) 90 tablet 1    Multiple Vitamin (MULTIVITAMIN ADULT PO) Take by mouth      fluticasone-salmeterol (ADVAIR HFA) 230-21 MCG/ACT inhaler Inhale 2 puffs into the lungs 2 times daily 3 each 1    albuterol sulfate HFA (VENTOLIN HFA) 108 (90 Base) MCG/ACT inhaler Inhale 2 puffs into the lungs 4 times daily as needed for Wheezing 18 g 0    ibuprofen (ADVIL;MOTRIN) 800 MG tablet Take 1 tablet by mouth 3 times daily (with meals) 30 tablet 0    Spacer/Aero-Holding Chambers MITRA 1 Device by Does not apply route daily as needed (use as instructed) 1 each 0     No current facility-administered medications for this visit. Return if symptoms worsen or fail to improve, for as schedule, physical.    An After Visit Summary was printed and given to the patient. Documentation was done using voice recognition dragon software. Every effort was made to ensure accuracy; however, inadvertent  Unintentional computerized transcription errors may be present.

## 2022-12-16 ENCOUNTER — OFFICE VISIT (OUTPATIENT)
Dept: INTERNAL MEDICINE CLINIC | Age: 42
End: 2022-12-16
Payer: COMMERCIAL

## 2022-12-16 VITALS
OXYGEN SATURATION: 97 % | WEIGHT: 155.2 LBS | DIASTOLIC BLOOD PRESSURE: 80 MMHG | SYSTOLIC BLOOD PRESSURE: 124 MMHG | HEART RATE: 63 BPM | BODY MASS INDEX: 25.05 KG/M2

## 2022-12-16 DIAGNOSIS — Z13.220 LIPID SCREENING: ICD-10-CM

## 2022-12-16 DIAGNOSIS — Z00.00 ENCOUNTER FOR WELL ADULT EXAM WITHOUT ABNORMAL FINDINGS: Primary | ICD-10-CM

## 2022-12-16 PROCEDURE — 99396 PREV VISIT EST AGE 40-64: CPT | Performed by: INTERNAL MEDICINE

## 2022-12-16 ASSESSMENT — ENCOUNTER SYMPTOMS
BACK PAIN: 0
SHORTNESS OF BREATH: 0
NAUSEA: 0
VOMITING: 0
COLOR CHANGE: 0
TROUBLE SWALLOWING: 0
ABDOMINAL PAIN: 0
VOICE CHANGE: 0
WHEEZING: 0
COUGH: 0

## 2022-12-16 NOTE — PROGRESS NOTES
Well Adult Note  Name: Félix  Date: 2022   MRN: [id number] Sex: Male   Age: 40 y.o. Ethnicity: Non- / Non    : 1980 Race: Other      Foreign Lara is here for well adult exam.  History:  Patient wants to have yearly physical.  Requesting cholesterol check. History of reactive airway disease. Since changing his job and no longer exposing to irritant/allergen, he no longer have respiratory symptoms. He denies cough shortness of breath or wheezing. Since last visit he did not have to use albuterol. Has been using Singulair every day. Review of Systems   Constitutional:  Negative for appetite change, chills, fever and unexpected weight change. HENT:  Negative for congestion, trouble swallowing and voice change. Respiratory:  Negative for cough, shortness of breath and wheezing. Cardiovascular:  Negative for chest pain, palpitations and leg swelling. Gastrointestinal:  Negative for abdominal pain, nausea and vomiting. Genitourinary:  Negative for dysuria, flank pain and hematuria. Musculoskeletal:  Negative for back pain, joint swelling, neck pain and neck stiffness. Occasional back tightness and spasm. Continue take as needed ibuprofen and muscle relaxant. Skin:  Negative for color change and rash. Neurological:  Negative for syncope, light-headedness and headaches. Psychiatric/Behavioral:  Negative for decreased concentration. The patient is not nervous/anxious. No Known Allergies      Prior to Visit Medications    Medication Sig Taking?  Authorizing Provider   montelukast (SINGULAIR) 10 MG tablet Take 1 tablet by mouth daily Yes Deborah Glaser MD   tiZANidine (ZANAFLEX) 2 MG tablet Take 1 tablet by mouth 3 times daily as needed (muscle spasm) Yes Deborah Glaser MD   Multiple Vitamin (MULTIVITAMIN ADULT PO) Take by mouth Yes Historical Provider, MD   Spacer/Aero-Holding Chambers MITRA 1 Device by Does not apply route daily as needed (use as instructed) Yes SAHRA Bustillos MD   albuterol sulfate HFA (VENTOLIN HFA) 108 (90 Base) MCG/ACT inhaler Inhale 2 puffs into the lungs 4 times daily as needed for Wheezing Yes Clemencia Truong APRN - CNP   ibuprofen (ADVIL;MOTRIN) 800 MG tablet Take 1 tablet by mouth 3 times daily (with meals) Yes Jacqueline Aquino MD         Past Medical History:   Diagnosis Date    Asthma        History reviewed. No pertinent surgical history. Family History   Problem Relation Age of Onset    Cancer Father         type unknown    Cancer Brother         liver       Social History     Tobacco Use    Smoking status: Never    Smokeless tobacco: Never   Vaping Use    Vaping Use: Unknown   Substance Use Topics    Alcohol use: Yes     Comment: moderate    Drug use: No       Objective   /80 (Site: Left Upper Arm)   Pulse 63   Wt 155 lb 3.2 oz (70.4 kg)   SpO2 97%   BMI 25.05 kg/m²   Wt Readings from Last 3 Encounters:   12/16/22 155 lb 3.2 oz (70.4 kg)   09/30/22 151 lb (68.5 kg)   07/15/22 149 lb 6.4 oz (67.8 kg)     There were no vitals filed for this visit. Physical Exam  Vitals and nursing note reviewed. Constitutional:       Appearance: Normal appearance. Eyes:      Conjunctiva/sclera: Conjunctivae normal.   Cardiovascular:      Rate and Rhythm: Normal rate and regular rhythm. Pulses: Normal pulses. Heart sounds: Normal heart sounds. Pulmonary:      Effort: Pulmonary effort is normal.      Breath sounds: Normal breath sounds. No wheezing or rhonchi. Abdominal:      General: Bowel sounds are normal.   Musculoskeletal:      Right lower leg: No edema. Left lower leg: No edema. Neurological:      General: No focal deficit present. Mental Status: He is alert and oriented to person, place, and time. Psychiatric:         Mood and Affect: Mood normal.         Behavior: Behavior normal.         Assessment   Plan   1.  Encounter for well adult exam without abnormal findings  Annual PE/Wellness exam:  Discussed age appropriate preventive care including healthy diet, daily exercise, immunizations and age & gender guided screening tests. 2. Lipid screening  -     Lipid, Fasting; Future     Patient will continue singular. As needed muscle relaxant tizanidine and ibuprofen for back spasm    Personalized Preventive Plan   Current Health Maintenance Status  Immunization History   Administered Date(s) Administered    COVID-19, MODERNA BLUE border, Primary or Immunocompromised, (age 12y+), IM, 100 mcg/0.5mL 05/27/2021, 06/24/2021    Influenza Virus Vaccine 10/10/2019, 11/05/2022    Influenza, FLUARIX, FLULAVAL, FLUZONE (age 10 mo+) AND AFLURIA, (age 1 y+), PF, 0.5mL 09/22/2017, 12/07/2020    Tdap (Boostrix, Adacel) 12/14/2016        Health Maintenance   Topic Date Due    Varicella vaccine (1 of 2 - 2-dose childhood series) Never done    Pneumococcal 0-64 years Vaccine (1 - PCV) Never done    COVID-19 Vaccine (3 - Booster for Moderna series) 08/19/2021    Depression Screen  07/15/2023    DTaP/Tdap/Td vaccine (2 - Td or Tdap) 12/14/2026    Lipids  12/27/2026    Flu vaccine  Completed    Hepatitis C screen  Completed    HIV screen  Completed    Hepatitis A vaccine  Aged Out    Hib vaccine  Aged Out    Meningococcal (ACWY) vaccine  Aged Out     Recommendations for [employer] Due: see orders and patient instructions/AVS.    Return in about 6 months (around 6/16/2023). An After Visit Summary was printed and given to the patient. Documentation was done using voice recognition dragon software. Every effort was made to ensure accuracy; however, inadvertent  Unintentional computerized transcription errors may be present.

## 2022-12-16 NOTE — PATIENT INSTRUCTIONS
Well Visit, Ages 25 to 72: Care Instructions  Well visits can help you stay healthy. Your doctor has checked your overall health and may have suggested ways to take good care of yourself. Your doctor also may have recommended tests. You can help prevent illness with healthy eating, good sleep, vaccinations, regular exercise, and other steps. Get the tests that you and your doctor decide on. Depending on your age and risks, examples might include screening for diabetes; hepatitis C; HIV; and cervical, breast, lung, and colon cancer. Screening helps find diseases before any symptoms appear. Eat healthy foods. Choose fruits, vegetables, whole grains, lean protein, and low-fat dairy foods. Limit saturated fat and reduce salt. Limit alcohol. Men should have no more than 2 drinks a day. Women should have no more than 1. For some people, no alcohol is the best choice. Exercise. Get at least 30 minutes of exercise on most days of the week. Walking can be a good choice. Reach and stay at your healthy weight. This will lower your risk for many health problems. Take care of your mental health. Try to stay connected with friends, family, and community, and find ways to manage stress. If you're feeling depressed or hopeless, talk to someone. A counselor can help. If you don't have a counselor, talk to your doctor. Talk to your doctor if you think you may have a problem with alcohol or drug use. This includes prescription medicines and illegal drugs. Avoid tobacco and nicotine: Don't smoke, vape, or chew. If you need help quitting, talk to your doctor. Practice safer sex. Getting tested, using condoms or dental dams, and limiting sex partners can help prevent STIs. Use birth control if it's important to you to prevent pregnancy. Talk with your doctor about your choices and what might be best for you. Prevent problems where you can.  Protect your skin from too much sun, wash your hands, brush your teeth twice a day, and wear a seat belt in the car. Where can you learn more? Go to http://www.munguia.com/ and enter P072 to learn more about \"Well Visit, Ages 25 to 72: Care Instructions. \"  Current as of: March 9, 2022               Content Version: 13.5  © 2667-9077 [employer], Incorporated. Care instructions adapted under license by [facility] ([facility]). If you have questions about a medical condition or this instruction, always ask your healthcare professional. [address] any warranty or liability for your use of this information.

## 2022-12-27 DIAGNOSIS — Z13.220 LIPID SCREENING: ICD-10-CM

## 2022-12-27 LAB
CHOLESTEROL, FASTING: 200 MG/DL (ref 0–199)
HDLC SERPL-MCNC: 39 MG/DL (ref 40–60)
LDL CHOLESTEROL CALCULATED: ABNORMAL MG/DL
LDL CHOLESTEROL DIRECT: 108 MG/DL
TRIGLYCERIDE, FASTING: 416 MG/DL (ref 0–150)
VLDLC SERPL CALC-MCNC: ABNORMAL MG/DL

## 2022-12-28 NOTE — RESULT ENCOUNTER NOTE
Elevated triglycerides. Please clarify with patient whether lab was drawn real fasting? If yes then we need to start him on some medicine for high triglyceride-like fenofibrate.

## 2022-12-30 ENCOUNTER — TELEPHONE (OUTPATIENT)
Dept: INTERNAL MEDICINE CLINIC | Age: 42
End: 2022-12-30

## 2023-01-24 ENCOUNTER — TELEPHONE (OUTPATIENT)
Dept: INTERNAL MEDICINE CLINIC | Age: 43
End: 2023-01-24

## 2023-01-24 DIAGNOSIS — J45.40 ASTHMA IN ADULT, MODERATE PERSISTENT, UNCOMPLICATED: ICD-10-CM

## 2023-01-24 DIAGNOSIS — J45.991 COUGH VARIANT ASTHMA: ICD-10-CM

## 2023-01-24 DIAGNOSIS — M62.830 BACK SPASM: ICD-10-CM

## 2023-01-24 RX ORDER — MONTELUKAST SODIUM 10 MG/1
10 TABLET ORAL DAILY
Qty: 90 TABLET | Refills: 1 | Status: SHIPPED | OUTPATIENT
Start: 2023-01-24

## 2023-01-24 RX ORDER — TIZANIDINE 2 MG/1
2 TABLET ORAL 3 TIMES DAILY PRN
Qty: 270 TABLET | Refills: 0 | Status: SHIPPED | OUTPATIENT
Start: 2023-01-24

## 2023-01-24 NOTE — TELEPHONE ENCOUNTER
Pt had been out of town. He is aware of the labs and stated that he was not fasting. I gave him dietary changes/additions to help with the triglycerides. He is asking for prescriptions to be sent to new pharmacy--[facility] 90 day per insurance. No albuterol needed.

## 2023-01-27 ENCOUNTER — OFFICE VISIT (OUTPATIENT)
Dept: INTERNAL MEDICINE CLINIC | Age: 43
End: 2023-01-27

## 2023-01-27 ENCOUNTER — HOSPITAL ENCOUNTER (OUTPATIENT)
Age: 43
Discharge: HOME | End: 2023-01-27

## 2023-01-27 ENCOUNTER — HOSPITAL ENCOUNTER (OUTPATIENT)
Dept: GENERAL RADIOLOGY | Age: 43
Discharge: HOME | End: 2023-01-27

## 2023-01-27 VITALS
OXYGEN SATURATION: 97 % | DIASTOLIC BLOOD PRESSURE: 70 MMHG | BODY MASS INDEX: 24.44 KG/M2 | SYSTOLIC BLOOD PRESSURE: 112 MMHG | HEART RATE: 79 BPM | WEIGHT: 151.4 LBS

## 2023-01-27 DIAGNOSIS — S59.902A ELBOW INJURY, LEFT, INITIAL ENCOUNTER: Primary | ICD-10-CM

## 2023-01-27 DIAGNOSIS — G44.309 POST-TRAUMATIC HEADACHE, NOT INTRACTABLE, UNSPECIFIED CHRONICITY PATTERN: ICD-10-CM

## 2023-01-27 DIAGNOSIS — S59.902A ELBOW INJURY, LEFT, INITIAL ENCOUNTER: ICD-10-CM
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
## 2023-01-27 PROCEDURE — 73070 X-RAY EXAM OF ELBOW: CPT

## 2023-01-27 RX ORDER — IBUPROFEN 600 MG/1
600 TABLET ORAL 3 TIMES DAILY PRN
Qty: 30 TABLET | Refills: 0 | Status: SHIPPED | OUTPATIENT
Start: 2023-01-27

## 2023-01-27 ASSESSMENT — ENCOUNTER SYMPTOMS
COUGH: 0
CHOKING: 0
SHORTNESS OF BREATH: 0
WHEEZING: 0
PHOTOPHOBIA: 0
NAUSEA: 0
VOMITING: 0
ABDOMINAL PAIN: 0
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
## 2023-05-15 ENCOUNTER — OFFICE VISIT (OUTPATIENT)
Dept: INTERNAL MEDICINE CLINIC | Age: 43
End: 2023-05-15
Payer: COMMERCIAL

## 2023-05-15 ENCOUNTER — HOSPITAL ENCOUNTER (OUTPATIENT)
Age: 43
Discharge: HOME | End: 2023-05-15
Payer: COMMERCIAL

## 2023-05-15 ENCOUNTER — HOSPITAL ENCOUNTER (OUTPATIENT)
Dept: GENERAL RADIOLOGY | Age: 43
Discharge: HOME | End: 2023-05-15
Payer: COMMERCIAL

## 2023-05-15 VITALS
HEIGHT: 66 IN | OXYGEN SATURATION: 97 % | DIASTOLIC BLOOD PRESSURE: 80 MMHG | SYSTOLIC BLOOD PRESSURE: 124 MMHG | HEART RATE: 73 BPM | BODY MASS INDEX: 23.82 KG/M2 | WEIGHT: 148.2 LBS

## 2023-05-15 DIAGNOSIS — M62.830 BACK SPASM: Primary | ICD-10-CM

## 2023-05-15 DIAGNOSIS — S39.012A BACK STRAIN, INITIAL ENCOUNTER: ICD-10-CM

## 2023-05-15 DIAGNOSIS — M62.830 BACK SPASM: ICD-10-CM

## 2023-05-15 DIAGNOSIS — M54.50 RECURRENT LOW BACK PAIN: ICD-10-CM

## 2023-05-15 PROCEDURE — 99214 OFFICE O/P EST MOD 30 MIN: CPT | Performed by: INTERNAL MEDICINE

## 2023-05-15 PROCEDURE — 72100 X-RAY EXAM L-S SPINE 2/3 VWS: CPT

## 2023-05-15 RX ORDER — KETOROLAC TROMETHAMINE 30 MG/ML
30 INJECTION, SOLUTION INTRAMUSCULAR; INTRAVENOUS ONCE
Status: COMPLETED | OUTPATIENT
Start: 2023-05-15 | End: 2023-05-15

## 2023-05-15 RX ORDER — PREDNISONE 20 MG/1
20 TABLET ORAL 2 TIMES DAILY
Qty: 14 TABLET | Refills: 0 | Status: SHIPPED | OUTPATIENT
Start: 2023-05-15 | End: 2023-05-22

## 2023-05-15 RX ORDER — IBUPROFEN 600 MG/1
600 TABLET ORAL 3 TIMES DAILY PRN
Qty: 270 TABLET | Refills: 1 | Status: SHIPPED | OUTPATIENT
Start: 2023-05-15

## 2023-05-15 RX ADMIN — KETOROLAC TROMETHAMINE 30 MG: 30 INJECTION, SOLUTION INTRAMUSCULAR; INTRAVENOUS at 15:56

## 2023-05-15 SDOH — ECONOMIC STABILITY: INCOME INSECURITY: HOW HARD IS IT FOR YOU TO PAY FOR THE VERY BASICS LIKE FOOD, HOUSING, MEDICAL CARE, AND HEATING?: NOT HARD AT ALL

## 2023-05-15 SDOH — ECONOMIC STABILITY: HOUSING INSECURITY
IN THE LAST 12 MONTHS, WAS THERE A TIME WHEN YOU DID NOT HAVE A STEADY PLACE TO SLEEP OR SLEPT IN A SHELTER (INCLUDING NOW)?: NO

## 2023-05-15 SDOH — ECONOMIC STABILITY: FOOD INSECURITY: WITHIN THE PAST 12 MONTHS, YOU WORRIED THAT YOUR FOOD WOULD RUN OUT BEFORE YOU GOT MONEY TO BUY MORE.: NEVER TRUE

## 2023-05-15 SDOH — ECONOMIC STABILITY: FOOD INSECURITY: WITHIN THE PAST 12 MONTHS, THE FOOD YOU BOUGHT JUST DIDN'T LAST AND YOU DIDN'T HAVE MONEY TO GET MORE.: NEVER TRUE

## 2023-05-15 ASSESSMENT — PATIENT HEALTH QUESTIONNAIRE - PHQ9
SUM OF ALL RESPONSES TO PHQ QUESTIONS 1-9: 0
SUM OF ALL RESPONSES TO PHQ9 QUESTIONS 1 & 2: 0
SUM OF ALL RESPONSES TO PHQ QUESTIONS 1-9: 0
1. LITTLE INTEREST OR PLEASURE IN DOING THINGS: 0
SUM OF ALL RESPONSES TO PHQ QUESTIONS 1-9: 0
SUM OF ALL RESPONSES TO PHQ QUESTIONS 1-9: 0
2. FEELING DOWN, DEPRESSED OR HOPELESS: 0

## 2023-05-15 ASSESSMENT — ENCOUNTER SYMPTOMS
WHEEZING: 0
TROUBLE SWALLOWING: 0
PHOTOPHOBIA: 0
ABDOMINAL PAIN: 0
NAUSEA: 0
SHORTNESS OF BREATH: 0
VOICE CHANGE: 0
VOMITING: 0

## 2023-05-15 NOTE — PROGRESS NOTES
Subjective:      Chief Complaint   Patient presents with    Back Pain     X 4 days L Side        Patient ID: Romi Michael is a 40 y.o. male. HPI  Patient has history of recurrent back pain. Apparently patient report he was working in his gardening and putting some bricks  layer and started to have gradual onset of pain,more in the left lumbar paraspinal area and then on the right paraspinal area for the last 4 days. He has history of recurrent back pain and spasm. Patient report he did not go to work today. He continues to suffer from significant pain with spasm get worse with bending twisting. He has been taking tizanidine without any improvement of symptoms. Review of Systems   Constitutional:  Negative for diaphoresis and unexpected weight change. HENT:  Negative for trouble swallowing and voice change. Eyes:  Negative for photophobia and visual disturbance. Respiratory:  Negative for shortness of breath and wheezing. Cardiovascular:  Negative for chest pain and palpitations. Gastrointestinal:  Negative for abdominal pain, nausea and vomiting. Neurological:  Negative for dizziness, light-headedness and headaches. Vitals:    05/15/23 1525   BP: 124/80   Pulse: 73   SpO2: 97%   Weight: 148 lb 3.2 oz (67.2 kg)   Height: 5' 6\" (1.676 m)       Objective:   Physical Exam  Vitals and nursing note reviewed. Constitutional:       General: He is not in acute distress. Appearance: Normal appearance. He is not ill-appearing. Eyes:      Conjunctiva/sclera: Conjunctivae normal.   Cardiovascular:      Rate and Rhythm: Normal rate and regular rhythm. Pulses: Normal pulses. Heart sounds: Normal heart sounds. Pulmonary:      Effort: Pulmonary effort is normal.      Breath sounds: Normal breath sounds. Abdominal:      General: Bowel sounds are normal.      Tenderness: There is no abdominal tenderness. There is no guarding or rebound.    Musculoskeletal:      Right lower leg:

## 2023-05-19 NOTE — RESULT ENCOUNTER NOTE
Mild arthritis changes. Keep appointment as advised.   May consider physical therapy if symptoms persist or get worse

## 2023-06-16 PROBLEM — M47.812 SPONDYLOSIS OF CERVICAL REGION WITHOUT MYELOPATHY OR RADICULOPATHY: Status: ACTIVE | Noted: 2023-06-16

## 2023-06-19 DIAGNOSIS — E78.5 HYPERLIPIDEMIA, UNSPECIFIED HYPERLIPIDEMIA TYPE: ICD-10-CM

## 2023-06-19 LAB
CHOLEST SERPL-MCNC: 173 MG/DL (ref 0–199)
HDLC SERPL-MCNC: 53 MG/DL (ref 40–60)
LDL CHOLESTEROL CALCULATED: 106 MG/DL
TRIGL SERPL-MCNC: 68 MG/DL (ref 0–150)
VLDLC SERPL CALC-MCNC: 14 MG/DL

## 2023-12-15 ENCOUNTER — HOSPITAL ENCOUNTER (OUTPATIENT)
Age: 43
Discharge: HOME | End: 2023-12-15
Payer: COMMERCIAL

## 2023-12-15 ENCOUNTER — OFFICE VISIT (OUTPATIENT)
Dept: INTERNAL MEDICINE CLINIC | Age: 43
End: 2023-12-15
Payer: COMMERCIAL

## 2023-12-15 ENCOUNTER — HOSPITAL ENCOUNTER (OUTPATIENT)
Dept: GENERAL RADIOLOGY | Age: 43
Discharge: HOME | End: 2023-12-15
Payer: COMMERCIAL

## 2023-12-15 VITALS
OXYGEN SATURATION: 98 % | WEIGHT: 156 LBS | HEART RATE: 97 BPM | BODY MASS INDEX: 25.18 KG/M2 | DIASTOLIC BLOOD PRESSURE: 80 MMHG | SYSTOLIC BLOOD PRESSURE: 124 MMHG

## 2023-12-15 DIAGNOSIS — M62.830 BACK SPASM: ICD-10-CM

## 2023-12-15 DIAGNOSIS — R10.32 LLQ PAIN: ICD-10-CM

## 2023-12-15 DIAGNOSIS — M23.92 INTERNAL DERANGEMENT OF LEFT KNEE: ICD-10-CM

## 2023-12-15 DIAGNOSIS — R10.32 LLQ PAIN: Primary | ICD-10-CM

## 2023-12-15 DIAGNOSIS — R19.4 BOWEL HABIT CHANGES: ICD-10-CM

## 2023-12-15 LAB
ALBUMIN SERPL-MCNC: 4.7 G/DL (ref 3.4–5)
ALBUMIN/GLOB SERPL: 2 {RATIO} (ref 1.1–2.2)
ALP SERPL-CCNC: 57 U/L (ref 40–129)
ALT SERPL-CCNC: 33 U/L (ref 10–40)
ANION GAP SERPL CALCULATED.3IONS-SCNC: 9 MMOL/L (ref 3–16)
AST SERPL-CCNC: 26 U/L (ref 15–37)
BILIRUB SERPL-MCNC: 0.5 MG/DL (ref 0–1)
BILIRUB UR QL STRIP.AUTO: NEGATIVE
BUN SERPL-MCNC: 9 MG/DL (ref 7–20)
CALCIUM SERPL-MCNC: 9.2 MG/DL (ref 8.3–10.6)
CHLORIDE SERPL-SCNC: 105 MMOL/L (ref 99–110)
CLARITY UR: CLEAR
CO2 SERPL-SCNC: 27 MMOL/L (ref 21–32)
COLOR UR: YELLOW
CREAT SERPL-MCNC: 1 MG/DL (ref 0.9–1.3)
DEPRECATED RDW RBC AUTO: 12.9 % (ref 12.4–15.4)
GFR SERPLBLD CREATININE-BSD FMLA CKD-EPI: >60 ML/MIN/{1.73_M2}
GLUCOSE SERPL-MCNC: 113 MG/DL (ref 70–99)
GLUCOSE UR STRIP.AUTO-MCNC: NEGATIVE MG/DL
HCT VFR BLD AUTO: 41.1 % (ref 40.5–52.5)
HGB BLD-MCNC: 13.8 G/DL (ref 13.5–17.5)
HGB UR QL STRIP.AUTO: NEGATIVE
IGA SERPL-MCNC: 88 MG/DL (ref 70–400)
KETONES UR STRIP.AUTO-MCNC: NEGATIVE MG/DL
LEUKOCYTE ESTERASE UR QL STRIP.AUTO: NEGATIVE
MCH RBC QN AUTO: 30.3 PG (ref 26–34)
MCHC RBC AUTO-ENTMCNC: 33.6 G/DL (ref 31–36)
MCV RBC AUTO: 90.2 FL (ref 80–100)
NITRITE UR QL STRIP.AUTO: NEGATIVE
PH UR STRIP.AUTO: 7.5 [PH] (ref 5–8)
PLATELET # BLD AUTO: 248 K/UL (ref 135–450)
PMV BLD AUTO: 8.5 FL (ref 5–10.5)
POTASSIUM SERPL-SCNC: 3.6 MMOL/L (ref 3.5–5.1)
PROT SERPL-MCNC: 7.1 G/DL (ref 6.4–8.2)
PROT UR STRIP.AUTO-MCNC: NEGATIVE MG/DL
RBC # BLD AUTO: 4.56 M/UL (ref 4.2–5.9)
SODIUM SERPL-SCNC: 141 MMOL/L (ref 136–145)
SP GR UR STRIP.AUTO: 1.02 (ref 1–1.03)
UA DIPSTICK W REFLEX MICRO PNL UR: NORMAL
URN SPEC COLLECT METH UR: NORMAL
UROBILINOGEN UR STRIP-ACNC: 1 E.U./DL
WBC # BLD AUTO: 5.6 K/UL (ref 4–11)

## 2023-12-15 PROCEDURE — 99214 OFFICE O/P EST MOD 30 MIN: CPT | Performed by: INTERNAL MEDICINE

## 2023-12-15 PROCEDURE — 73562 X-RAY EXAM OF KNEE 3: CPT

## 2023-12-15 RX ORDER — TIZANIDINE 2 MG/1
2 TABLET ORAL 3 TIMES DAILY PRN
Qty: 270 TABLET | Refills: 0 | Status: SHIPPED | OUTPATIENT
Start: 2023-12-15

## 2023-12-15 NOTE — PROGRESS NOTES
Leonard Heard  1980  male  44 y.o. SUBJECTIVE:       Chief Complaint   Patient presents with    Follow-up     6 month f/u     Knee Pain     Left knee pain can't bend it all the way    GI Problem     Everytime he eats he has to instantly go to the bathroom       HPI:  Follow-up visit . Patient is complaining of left knee pain on and off for years. He is also complaining of occasional abdominal cramp with bowel movement immediately after eating food. Patient feels his asthma symptom has been stable. Recently did not have to take any inhalers. Past Medical History:   Diagnosis Date    Asthma      History reviewed. No pertinent surgical history. Social History     Socioeconomic History    Marital status:      Spouse name: None    Number of children: 1    Years of education: None    Highest education level: None   Occupational History    Occupation: [employer]     Comment: [employer], 12 hr days. Tobacco Use    Smoking status: Never    Smokeless tobacco: Never   Vaping Use    Vaping Use: Unknown   Substance and Sexual Activity    Alcohol use: Yes     Comment: moderate    Drug use: No    Sexual activity: Yes     Social Determinants of Health     Financial Resource Strain: Low Risk  (5/15/2023)    Overall Financial Resource Strain (CARDIA)     Difficulty of Paying Living Expenses: Not hard at all   Transportation Needs: Unknown (5/15/2023)    PRAPARE - Transportation     Lack of Transportation (Non-Medical): No   Housing Stability: Unknown (5/15/2023)    Housing Stability Vital Sign     Unstable Housing in the Last Year: No     Family History   Problem Relation Age of Onset    Cancer Father         type unknown    Cancer Brother         liver       Review of Systems   Constitutional:  Negative for diaphoresis and unexpected weight change. Eyes:  Negative for photophobia and visual disturbance. Respiratory:  Negative for shortness of breath and wheezing.     Cardiovascular:  Negative

## 2023-12-16 LAB — TISSUE TRANSGLUTAMINASE IGA: <0.5 U/ML (ref 0–14)

## 2023-12-17 NOTE — RESULT ENCOUNTER NOTE
Labs are stable except slightly elevated glucose, diet lifestyle changes.  Additional testing as advised and keep appointment as advised

## 2023-12-29 ENCOUNTER — OFFICE VISIT (OUTPATIENT)
Dept: ORTHOPEDIC SURGERY | Age: 43
End: 2023-12-29

## 2023-12-29 DIAGNOSIS — M17.12 PRIMARY OSTEOARTHRITIS OF LEFT KNEE: ICD-10-CM

## 2023-12-29 DIAGNOSIS — M25.562 LEFT KNEE PAIN, UNSPECIFIED CHRONICITY: Primary | ICD-10-CM

## 2023-12-29 RX ORDER — TRIAMCINOLONE ACETONIDE 40 MG/ML
40 INJECTION, SUSPENSION INTRA-ARTICULAR; INTRAMUSCULAR ONCE
Status: COMPLETED | OUTPATIENT
Start: 2023-12-29 | End: 2023-12-29

## 2023-12-29 RX ORDER — LIDOCAINE HYDROCHLORIDE 10 MG/ML
4 INJECTION, SOLUTION INFILTRATION; PERINEURAL ONCE
Status: COMPLETED | OUTPATIENT
Start: 2023-12-29 | End: 2023-12-29

## 2023-12-29 RX ADMIN — TRIAMCINOLONE ACETONIDE 40 MG: 40 INJECTION, SUSPENSION INTRA-ARTICULAR; INTRAMUSCULAR at 14:05

## 2023-12-29 RX ADMIN — LIDOCAINE HYDROCHLORIDE 4 ML: 10 INJECTION, SOLUTION INFILTRATION; PERINEURAL at 14:04

## 2023-12-29 NOTE — PROGRESS NOTES
Patient: Stephanie Richmond  : 1980    MRN: [id number]    Date of Visit: 23    Attending Physician: Todd Gaines    History of Present Illness  Mr.. Zoe Rizvi is a very pleasant 44 y.o. patient with a several year history of progressive LEFT knee pain. There is no precipitating event or trauma. The pain is located predominantly in the anterior and medial aspect of the knee aggravated by weight bearing. Walking even short distances can be painful. Stair climbing is progressing becoming more difficult and painful. However, it can also awaken the patient at night. He has tried the following interventions without sustained functional improvement:    NSAID's/Tylenol Arthritis strength  Knee brace      PMH/PSH:  Past Medical History:   Diagnosis Date    Asthma      Patient Active Problem List   Diagnosis    Elevated bilirubin    Cough variant asthma    Strain of back    Spondylosis of cervical region without myelopathy or radiculopathy     No past surgical history on file.         MEDS:  Scheduled Meds:  Continuous Infusions:  PRN Meds:  Current Meds:  Current Outpatient Medications:     tiZANidine (ZANAFLEX) 2 MG tablet, Take 1 tablet by mouth 3 times daily as needed (muscle spasm), Disp: 270 tablet, Rfl: 0    montelukast (SINGULAIR) 10 MG tablet, Take 1 tablet by mouth daily, Disp: 90 tablet, Rfl: 1    ibuprofen (ADVIL;MOTRIN) 600 MG tablet, Take 1 tablet by mouth 3 times daily as needed for Pain, Disp: 270 tablet, Rfl: 1    Multiple Vitamin (MULTIVITAMIN ADULT PO), Take by mouth, Disp: , Rfl:     Spacer/Aero-Holding Chambers MITRA, 1 Device by Does not apply route daily as needed (use as instructed), Disp: 1 each, Rfl: 0    albuterol sulfate HFA (VENTOLIN HFA) 108 (90 Base) MCG/ACT inhaler, Inhale 2 puffs into the lungs 4 times daily as needed for Wheezing, Disp: 18 g, Rfl: 0        ALLERGIES:  No Known Allergies      Social History:   Social History     Socioeconomic History    Marital status:

## 2024-01-05 ENCOUNTER — HOSPITAL ENCOUNTER (OUTPATIENT)
Dept: CT IMAGING | Age: 44
Discharge: HOME | End: 2024-01-05
Attending: INTERNAL MEDICINE
Payer: COMMERCIAL

## 2024-01-05 DIAGNOSIS — R10.32 LLQ PAIN: ICD-10-CM

## 2024-01-05 PROCEDURE — 6360000004 HC RX CONTRAST MEDICATION: Performed by: INTERNAL MEDICINE

## 2024-01-05 PROCEDURE — 74176 CT ABD & PELVIS W/O CONTRAST: CPT

## 2024-01-05 RX ADMIN — IOHEXOL 20 ML: 240 INJECTION, SOLUTION INTRATHECAL; INTRAVASCULAR; INTRAVENOUS; ORAL at 17:24

## 2024-01-08 ENCOUNTER — TELEPHONE (OUTPATIENT)
Dept: INTERNAL MEDICINE CLINIC | Age: 44
End: 2024-01-08

## 2024-01-08 ENCOUNTER — OFFICE VISIT (OUTPATIENT)
Dept: INTERNAL MEDICINE CLINIC | Age: 44
End: 2024-01-08
Payer: COMMERCIAL

## 2024-01-08 VITALS
OXYGEN SATURATION: 98 % | SYSTOLIC BLOOD PRESSURE: 124 MMHG | DIASTOLIC BLOOD PRESSURE: 84 MMHG | WEIGHT: 152.8 LBS | HEART RATE: 91 BPM | BODY MASS INDEX: 24.66 KG/M2

## 2024-01-08 DIAGNOSIS — G89.29 CHRONIC ABDOMINAL PAIN: ICD-10-CM

## 2024-01-08 DIAGNOSIS — R06.6 HICCOUGH: Primary | ICD-10-CM

## 2024-01-08 DIAGNOSIS — R10.9 CHRONIC ABDOMINAL PAIN: ICD-10-CM

## 2024-01-08 PROCEDURE — 99213 OFFICE O/P EST LOW 20 MIN: CPT | Performed by: INTERNAL MEDICINE

## 2024-01-08 RX ORDER — PROCHLORPERAZINE MALEATE 10 MG
10 TABLET ORAL EVERY 8 HOURS PRN
Qty: 30 TABLET | Refills: 0 | Status: SHIPPED | OUTPATIENT
Start: 2024-01-08

## 2024-01-08 ASSESSMENT — ENCOUNTER SYMPTOMS
NAUSEA: 0
VOICE CHANGE: 0
WHEEZING: 0
VOMITING: 0
PHOTOPHOBIA: 0
TROUBLE SWALLOWING: 0
DIARRHEA: 0
CONSTIPATION: 0
SHORTNESS OF BREATH: 0

## 2024-01-08 ASSESSMENT — PATIENT HEALTH QUESTIONNAIRE - PHQ9
SUM OF ALL RESPONSES TO PHQ QUESTIONS 1-9: 0
SUM OF ALL RESPONSES TO PHQ QUESTIONS 1-9: 0
SUM OF ALL RESPONSES TO PHQ9 QUESTIONS 1 & 2: 0
SUM OF ALL RESPONSES TO PHQ QUESTIONS 1-9: 0
SUM OF ALL RESPONSES TO PHQ QUESTIONS 1-9: 0
2. FEELING DOWN, DEPRESSED OR HOPELESS: 0
1. LITTLE INTEREST OR PLEASURE IN DOING THINGS: 0

## 2024-01-08 NOTE — PROGRESS NOTES
Corbin Colmenares  1980  male  43 y.o.    SUBJECTIVE:       Chief Complaint   Patient presents with    acute     Hiccups for 3 days. Started after steroid shot in knee.       HPI:  Patient has been complaining of hiccups since starting steroid injection at his knees.  He reports he is having more persistent take-up on the night he received steroid injection.  His symptoms gradually getting better.  He still get occasional hiccups.    History of chronic abdominal pain mostly in the left lower quadrant and flank area.    Patient denies any upper abdominal pain, reflux symptoms, nausea vomiting cough.  Denies any urinary symptoms.  Patient reported he tried multiple maneuvers to relieve the symptoms without any success.    Past Medical History:   Diagnosis Date    Asthma      No past surgical history on file.  Social History     Socioeconomic History    Marital status:     Number of children: 1   Occupational History    Occupation: [employer]     Comment: [employer], 12 hr days.    Tobacco Use    Smoking status: Never    Smokeless tobacco: Never   Vaping Use    Vaping Use: Unknown   Substance and Sexual Activity    Alcohol use: Yes     Comment: moderate    Drug use: No    Sexual activity: Yes     Social Determinants of Health     Financial Resource Strain: Low Risk  (5/15/2023)    Overall Financial Resource Strain (CARDIA)     Difficulty of Paying Living Expenses: Not hard at all   Transportation Needs: Unknown (5/15/2023)    PRAPARE - Transportation     Lack of Transportation (Non-Medical): No   Physical Activity: Insufficiently Active (12/29/2023)    Exercise Vital Sign     Days of Exercise per Week: 1 day     Minutes of Exercise per Session: 20 min   Housing Stability: Unknown (5/15/2023)    Housing Stability Vital Sign     Unstable Housing in the Last Year: No     Family History   Problem Relation Age of Onset    Cancer Father         type unknown    Cancer Brother         liver       Review of

## 2024-03-13 DIAGNOSIS — M62.830 BACK SPASM: ICD-10-CM

## 2024-03-13 RX ORDER — TIZANIDINE 2 MG/1
2 TABLET ORAL 3 TIMES DAILY PRN
Qty: 270 TABLET | Refills: 0 | Status: SHIPPED | OUTPATIENT
Start: 2024-03-13

## 2024-03-15 LAB — IGA SERPL-MCNC: 72 MG/DL (ref 70–400)

## 2024-03-16 LAB — TISSUE TRANSGLUTAMINASE IGA: <0.5 U/ML (ref 0–14)

## 2024-03-19 DIAGNOSIS — J45.991 COUGH VARIANT ASTHMA: ICD-10-CM

## 2024-03-19 DIAGNOSIS — J45.40 ASTHMA IN ADULT, MODERATE PERSISTENT, UNCOMPLICATED: ICD-10-CM

## 2024-03-19 RX ORDER — MONTELUKAST SODIUM 10 MG/1
10 TABLET ORAL DAILY
Qty: 90 TABLET | Refills: 1 | Status: SHIPPED | OUTPATIENT
Start: 2024-03-19

## 2024-04-08 ENCOUNTER — TELEPHONE (OUTPATIENT)
Dept: ORTHOPEDIC SURGERY | Age: 44
End: 2024-04-08

## 2024-04-12 ENCOUNTER — OFFICE VISIT (OUTPATIENT)
Dept: INTERNAL MEDICINE CLINIC | Age: 44
End: 2024-04-12
Payer: COMMERCIAL

## 2024-04-12 VITALS
WEIGHT: 153 LBS | OXYGEN SATURATION: 97 % | HEART RATE: 67 BPM | BODY MASS INDEX: 24.69 KG/M2 | DIASTOLIC BLOOD PRESSURE: 80 MMHG | SYSTOLIC BLOOD PRESSURE: 138 MMHG

## 2024-04-12 DIAGNOSIS — J45.40 ASTHMA IN ADULT, MODERATE PERSISTENT, UNCOMPLICATED: Primary | ICD-10-CM

## 2024-04-12 DIAGNOSIS — M62.830 BACK SPASM: ICD-10-CM

## 2024-04-12 DIAGNOSIS — G89.29 CHRONIC ABDOMINAL PAIN: ICD-10-CM

## 2024-04-12 DIAGNOSIS — R10.9 CHRONIC ABDOMINAL PAIN: ICD-10-CM

## 2024-04-12 DIAGNOSIS — J45.991 COUGH VARIANT ASTHMA: ICD-10-CM

## 2024-04-12 PROCEDURE — 99213 OFFICE O/P EST LOW 20 MIN: CPT | Performed by: INTERNAL MEDICINE

## 2024-04-12 RX ORDER — MONTELUKAST SODIUM 10 MG/1
10 TABLET ORAL DAILY
Qty: 90 TABLET | Refills: 3 | Status: SHIPPED | OUTPATIENT
Start: 2024-04-12

## 2024-04-12 ASSESSMENT — ENCOUNTER SYMPTOMS
SHORTNESS OF BREATH: 0
NAUSEA: 0
ABDOMINAL PAIN: 0
WHEEZING: 0
DIARRHEA: 0
CONSTIPATION: 0
COUGH: 0
VOMITING: 0
PHOTOPHOBIA: 0

## 2024-04-12 NOTE — PROGRESS NOTES
mouth daily    Back spasm  May use tizanidine as needed  Ibuprofen as needed          No orders of the defined types were placed in this encounter.    Current Outpatient Medications   Medication Sig Dispense Refill    montelukast (SINGULAIR) 10 MG tablet Take 1 tablet by mouth daily 90 tablet 3    tiZANidine (ZANAFLEX) 2 MG tablet TAKE 1 TABLET BY MOUTH 3 TIMES DAILY AS NEEDED (MUSCLE SPASM). 270 tablet 0    ibuprofen (ADVIL;MOTRIN) 600 MG tablet Take 1 tablet by mouth 3 times daily as needed for Pain 270 tablet 1    Multiple Vitamin (MULTIVITAMIN ADULT PO) Take by mouth      Spacer/Aero-Holding Chambers MITRA 1 Device by Does not apply route daily as needed (use as instructed) 1 each 0    albuterol sulfate HFA (VENTOLIN HFA) 108 (90 Base) MCG/ACT inhaler Inhale 2 puffs into the lungs 4 times daily as needed for Wheezing 18 g 0     No current facility-administered medications for this visit.       Return in about 6 months (around 10/12/2024), or if symptoms worsen or fail to improve, for physical.  An After Visit Summary was printed and given to the patient.  Documentation was done using voice recognition dragon software.  Every effort was made to ensure accuracy; however, inadvertent  Unintentional computerized transcription errors may be present.

## 2024-04-22 ENCOUNTER — OFFICE VISIT (OUTPATIENT)
Dept: ORTHOPEDIC SURGERY | Age: 44
End: 2024-04-22
Payer: COMMERCIAL

## 2024-04-22 VITALS — WEIGHT: 153 LBS | HEIGHT: 66 IN | BODY MASS INDEX: 24.59 KG/M2

## 2024-04-22 DIAGNOSIS — M25.562 LEFT KNEE PAIN, UNSPECIFIED CHRONICITY: Primary | ICD-10-CM

## 2024-04-22 PROCEDURE — 20610 DRAIN/INJ JOINT/BURSA W/O US: CPT | Performed by: ORTHOPAEDIC SURGERY

## 2024-04-22 RX ORDER — MELOXICAM 15 MG/1
15 TABLET ORAL DAILY
Qty: 30 TABLET | Refills: 0 | Status: SHIPPED | OUTPATIENT
Start: 2024-04-22

## 2024-04-22 RX ORDER — TRIAMCINOLONE ACETONIDE 40 MG/ML
40 INJECTION, SUSPENSION INTRA-ARTICULAR; INTRAMUSCULAR ONCE
Status: COMPLETED | OUTPATIENT
Start: 2024-04-22 | End: 2024-04-22

## 2024-04-22 RX ORDER — LIDOCAINE HYDROCHLORIDE 10 MG/ML
4 INJECTION, SOLUTION INFILTRATION; PERINEURAL ONCE
Status: COMPLETED | OUTPATIENT
Start: 2024-04-22 | End: 2024-04-22

## 2024-04-22 RX ADMIN — LIDOCAINE HYDROCHLORIDE 4 ML: 10 INJECTION, SOLUTION INFILTRATION; PERINEURAL at 15:39

## 2024-04-22 RX ADMIN — TRIAMCINOLONE ACETONIDE 40 MG: 40 INJECTION, SUSPENSION INTRA-ARTICULAR; INTRAMUSCULAR at 15:39

## 2024-04-22 NOTE — PROGRESS NOTES
Patient: Corbin Colmenares  : 1980    MRN: [id number]    Date of Visit: 24    Attending Physician: Antonio Restrepo    History of Present Illness  Mr.. Colmenares is a very pleasant 43 y.o. patient with a several year history of progressive LEFT knee pain. There is no precipitating event or trauma. The pain is located predominantly in the anterior and medial aspect of the knee aggravated by weight bearing. Walking even short distances can be painful. Stair climbing is progressing becoming more difficult and painful. However, it can also awaken the patient at night. He has tried the following interventions without sustained functional improvement:    NSAID's/Tylenol Arthritis strength  Knee brace  Previous injections have been helpful however they only lasted about 3 to 4 months he returns today for repeat injection      PMH/PSH:  Past Medical History:   Diagnosis Date    Asthma      Patient Active Problem List   Diagnosis    Elevated bilirubin    Cough variant asthma    Strain of back    Spondylosis of cervical region without myelopathy or radiculopathy     History reviewed. No pertinent surgical history.        MEDS:  Scheduled Meds:  Continuous Infusions:  PRN Meds:  Current Meds:  Current Outpatient Medications:     montelukast (SINGULAIR) 10 MG tablet, Take 1 tablet by mouth daily, Disp: 90 tablet, Rfl: 3    tiZANidine (ZANAFLEX) 2 MG tablet, TAKE 1 TABLET BY MOUTH 3 TIMES DAILY AS NEEDED (MUSCLE SPASM)., Disp: 270 tablet, Rfl: 0    ibuprofen (ADVIL;MOTRIN) 600 MG tablet, Take 1 tablet by mouth 3 times daily as needed for Pain, Disp: 270 tablet, Rfl: 1    Multiple Vitamin (MULTIVITAMIN ADULT PO), Take by mouth, Disp: , Rfl:     Spacer/Aero-Holding Chambers MITRA, 1 Device by Does not apply route daily as needed (use as instructed), Disp: 1 each, Rfl: 0    albuterol sulfate HFA (VENTOLIN HFA) 108 (90 Base) MCG/ACT inhaler, Inhale 2 puffs into the lungs 4 times daily as needed for Wheezing,

## 2024-05-20 RX ORDER — MELOXICAM 15 MG/1
15 TABLET ORAL DAILY
Qty: 30 TABLET | Refills: 0 | OUTPATIENT
Start: 2024-05-20

## 2024-06-16 DIAGNOSIS — M62.830 BACK SPASM: ICD-10-CM

## 2024-06-17 RX ORDER — TIZANIDINE 2 MG/1
2 TABLET ORAL 3 TIMES DAILY PRN
Qty: 270 TABLET | Refills: 0 | Status: SHIPPED | OUTPATIENT
Start: 2024-06-17

## 2024-06-17 NOTE — TELEPHONE ENCOUNTER
Medication:   Requested Prescriptions     Pending Prescriptions Disp Refills    tiZANidine (ZANAFLEX) 2 MG tablet [Pharmacy Med Name: TIZANIDINE HCL 2 MG TABLET] 270 tablet 0     Sig: TAKE 1 TABLET BY MOUTH 3 TIMES DAILY AS NEEDED (MUSCLE SPASM).        Last Filled:  3/13/24    Patient Phone Number: [phone] (home)     Last appt: 4/12/2024   Next appt: 10/11/2024    Last OARRS:        No data to display

## 2024-09-14 DIAGNOSIS — M62.830 BACK SPASM: ICD-10-CM

## 2024-09-16 RX ORDER — TIZANIDINE 2 MG/1
2 TABLET ORAL 3 TIMES DAILY PRN
Qty: 270 TABLET | Refills: 0 | Status: SHIPPED | OUTPATIENT
Start: 2024-09-16

## 2024-10-11 ENCOUNTER — OFFICE VISIT (OUTPATIENT)
Dept: INTERNAL MEDICINE CLINIC | Age: 44
End: 2024-10-11

## 2024-10-11 VITALS
SYSTOLIC BLOOD PRESSURE: 128 MMHG | BODY MASS INDEX: 24.21 KG/M2 | OXYGEN SATURATION: 98 % | HEART RATE: 62 BPM | DIASTOLIC BLOOD PRESSURE: 78 MMHG | WEIGHT: 150 LBS

## 2024-10-11 DIAGNOSIS — J45.40 ASTHMA IN ADULT, MODERATE PERSISTENT, UNCOMPLICATED: Primary | ICD-10-CM

## 2024-10-11 DIAGNOSIS — J45.991 COUGH VARIANT ASTHMA: ICD-10-CM

## 2024-10-11 DIAGNOSIS — Z29.89 NEED FOR MALARIA PROPHYLAXIS: ICD-10-CM

## 2024-10-11 DIAGNOSIS — Z23 NEED FOR HEPATITIS B VACCINATION: ICD-10-CM

## 2024-10-11 RX ORDER — MEFLOQUINE HYDROCHLORIDE 250 MG/1
250 TABLET ORAL
Qty: 15 TABLET | Refills: 0 | Status: SHIPPED | OUTPATIENT
Start: 2024-10-11 | End: 2025-01-18

## 2024-10-11 RX ORDER — ALBUTEROL SULFATE 90 UG/1
2 INHALANT RESPIRATORY (INHALATION) 4 TIMES DAILY PRN
Qty: 18 G | Refills: 1 | Status: SHIPPED | OUTPATIENT
Start: 2024-10-11

## 2024-10-11 SDOH — ECONOMIC STABILITY: FOOD INSECURITY: WITHIN THE PAST 12 MONTHS, THE FOOD YOU BOUGHT JUST DIDN'T LAST AND YOU DIDN'T HAVE MONEY TO GET MORE.: NEVER TRUE

## 2024-10-11 SDOH — ECONOMIC STABILITY: FOOD INSECURITY: WITHIN THE PAST 12 MONTHS, YOU WORRIED THAT YOUR FOOD WOULD RUN OUT BEFORE YOU GOT MONEY TO BUY MORE.: NEVER TRUE

## 2024-10-11 SDOH — ECONOMIC STABILITY: INCOME INSECURITY: HOW HARD IS IT FOR YOU TO PAY FOR THE VERY BASICS LIKE FOOD, HOUSING, MEDICAL CARE, AND HEATING?: NOT HARD AT ALL

## 2024-10-11 ASSESSMENT — ENCOUNTER SYMPTOMS
ABDOMINAL PAIN: 0
WHEEZING: 0
CHEST TIGHTNESS: 0
VOMITING: 0
VOICE CHANGE: 0
NAUSEA: 0
SHORTNESS OF BREATH: 0
TROUBLE SWALLOWING: 0
PHOTOPHOBIA: 0

## 2024-10-11 NOTE — PROGRESS NOTES
(LARIAM) 250 MG tablet; Take 1 tablet by mouth every 7 days for 15 doses Start 2 weeks before the travel and continue 4 weeks after arrival to [place]              Orders Placed This Encounter   Procedures    Hep B, HEPLISAV-B, (age 18 yrs+), IM, 0.5mL, 2-dose     Current Outpatient Medications   Medication Sig Dispense Refill    mefloquine (LARIAM) 250 MG tablet Take 1 tablet by mouth every 7 days for 15 doses Start 2 weeks before the travel and continue 4 weeks after arrival to [place] 15 tablet 0    albuterol sulfate HFA (VENTOLIN HFA) 108 (90 Base) MCG/ACT inhaler Inhale 2 puffs into the lungs 4 times daily as needed for Wheezing 18 g 1    tiZANidine (ZANAFLEX) 2 MG tablet TAKE 1 TABLET BY MOUTH 3 TIMES DAILY AS NEEDED (MUSCLE SPASM). 270 tablet 0    meloxicam (MOBIC) 15 MG tablet Take 1 tablet by mouth daily 30 tablet 0    montelukast (SINGULAIR) 10 MG tablet Take 1 tablet by mouth daily 90 tablet 3    ibuprofen (ADVIL;MOTRIN) 600 MG tablet Take 1 tablet by mouth 3 times daily as needed for Pain 270 tablet 1    Multiple Vitamin (MULTIVITAMIN ADULT PO) Take by mouth      Spacer/Aero-Holding Chambers MITRA 1 Device by Does not apply route daily as needed (use as instructed) 1 each 0     No current facility-administered medications for this visit.       Return in about 6 months (around 4/11/2025) for physical.  An After Visit Summary was printed and given to the patient.  Documentation was done using voice recognition dragon software.  Every effort was made to ensure accuracy; however, inadvertent  Unintentional computerized transcription errors may be present.     
none

## 2024-11-04 ENCOUNTER — OFFICE VISIT (OUTPATIENT)
Dept: ORTHOPEDIC SURGERY | Age: 44
End: 2024-11-04
Payer: COMMERCIAL

## 2024-11-04 DIAGNOSIS — M25.562 LEFT KNEE PAIN, UNSPECIFIED CHRONICITY: ICD-10-CM

## 2024-11-04 DIAGNOSIS — M17.12 PRIMARY OSTEOARTHRITIS OF LEFT KNEE: Primary | ICD-10-CM

## 2024-11-04 PROCEDURE — 20610 DRAIN/INJ JOINT/BURSA W/O US: CPT | Performed by: ORTHOPAEDIC SURGERY

## 2024-11-04 RX ORDER — LIDOCAINE HYDROCHLORIDE 10 MG/ML
4 INJECTION, SOLUTION INFILTRATION; PERINEURAL ONCE
Status: COMPLETED | OUTPATIENT
Start: 2024-11-04 | End: 2024-11-04

## 2024-11-04 RX ORDER — TRIAMCINOLONE ACETONIDE 40 MG/ML
40 INJECTION, SUSPENSION INTRA-ARTICULAR; INTRAMUSCULAR ONCE
Status: COMPLETED | OUTPATIENT
Start: 2024-11-04 | End: 2024-11-04

## 2024-11-04 RX ADMIN — LIDOCAINE HYDROCHLORIDE 4 ML: 10 INJECTION, SOLUTION INFILTRATION; PERINEURAL at 15:13

## 2024-11-04 RX ADMIN — TRIAMCINOLONE ACETONIDE 40 MG: 40 INJECTION, SUSPENSION INTRA-ARTICULAR; INTRAMUSCULAR at 15:13

## 2024-11-04 NOTE — PROGRESS NOTES
Joint Injection: risks and benefits were discussed with the patient, after preparation of the injection site with alcohol, a combination of 1cc kenelog and 4cc marcaine totaling 5 cc was injected into the LEFT KNEE joint for arthriits. The procedure was tolerated well without adverse reaction. The patient was counseled on potential reactions to the injection and given information on follow up and when to seek medical attention. The patient will monitor how long relief persists.    I explained that we could proceed with another injection in about 6 weeks prior to his trip to [place] however my normal regimen is 3 months

## 2024-11-12 ENCOUNTER — TELEPHONE (OUTPATIENT)
Dept: INTERNAL MEDICINE CLINIC | Age: 44
End: 2024-11-12

## 2024-11-12 ENCOUNTER — NURSE ONLY (OUTPATIENT)
Dept: INTERNAL MEDICINE CLINIC | Age: 44
End: 2024-11-12
Payer: COMMERCIAL

## 2024-11-12 DIAGNOSIS — Z23 NEED FOR HEPATITIS B VACCINATION: Primary | ICD-10-CM

## 2024-11-12 PROCEDURE — 90471 IMMUNIZATION ADMIN: CPT | Performed by: INTERNAL MEDICINE

## 2024-11-12 PROCEDURE — 90739 HEPB VACC 2/4 DOSE ADULT IM: CPT | Performed by: INTERNAL MEDICINE

## 2024-11-12 NOTE — TELEPHONE ENCOUNTER
Pt was in for second hep B and stated that he has not been able to get the mefloquine (LARIAM) 250 MG tablet. I called the pharmacy to see what was going on. The pharmacy states that this is on backorder until february. They do have atovaquone-proguanil (MALARONE) 250-100 MG per tablet in stock. Okay to send?

## 2024-11-13 DIAGNOSIS — Z29.89 NEED FOR MALARIA PROPHYLAXIS: Primary | ICD-10-CM

## 2024-11-13 RX ORDER — ATOVAQUONE AND PROGUANIL HYDROCHLORIDE 250; 100 MG/1; MG/1
1 TABLET, FILM COATED ORAL DAILY
Qty: 40 TABLET | Refills: 0 | Status: SHIPPED | OUTPATIENT
Start: 2024-11-13 | End: 2024-12-23

## 2024-12-12 DIAGNOSIS — M62.830 BACK SPASM: ICD-10-CM

## 2024-12-12 RX ORDER — TIZANIDINE 2 MG/1
2 TABLET ORAL 3 TIMES DAILY PRN
Qty: 270 TABLET | Refills: 1 | Status: SHIPPED | OUTPATIENT
Start: 2024-12-12

## 2024-12-16 ENCOUNTER — OFFICE VISIT (OUTPATIENT)
Dept: ORTHOPEDIC SURGERY | Age: 44
End: 2024-12-16
Payer: COMMERCIAL

## 2024-12-16 DIAGNOSIS — M17.12 PRIMARY OSTEOARTHRITIS OF LEFT KNEE: Primary | ICD-10-CM

## 2024-12-16 PROCEDURE — 20610 DRAIN/INJ JOINT/BURSA W/O US: CPT | Performed by: ORTHOPAEDIC SURGERY

## 2024-12-16 RX ORDER — TRIAMCINOLONE ACETONIDE 40 MG/ML
40 INJECTION, SUSPENSION INTRA-ARTICULAR; INTRAMUSCULAR ONCE
Status: COMPLETED | OUTPATIENT
Start: 2024-12-16 | End: 2024-12-16

## 2024-12-16 RX ORDER — LIDOCAINE HYDROCHLORIDE 10 MG/ML
4 INJECTION, SOLUTION INFILTRATION; PERINEURAL ONCE
Status: COMPLETED | OUTPATIENT
Start: 2024-12-16 | End: 2024-12-16

## 2024-12-16 RX ADMIN — TRIAMCINOLONE ACETONIDE 40 MG: 40 INJECTION, SUSPENSION INTRA-ARTICULAR; INTRAMUSCULAR at 15:16

## 2024-12-16 RX ADMIN — LIDOCAINE HYDROCHLORIDE 4 ML: 10 INJECTION, SOLUTION INFILTRATION; PERINEURAL at 15:16

## 2024-12-16 NOTE — PROGRESS NOTES
Joint Injection: risks and benefits were discussed with the patient, after preparation of the injection site with alcohol, a combination of 1cc kenelog and 4cc marcaine totaling 5 cc was injected into the LEFT KNEE joint for arthriits. The procedure was tolerated well without adverse reaction. The patient was counseled on potential reactions to the injection and given information on follow up and when to seek medical attention. The patient will monitor how long relief persists.    He will f/u on an as needed basis after returning from [place]

## 2025-04-11 SDOH — ECONOMIC STABILITY: FOOD INSECURITY: WITHIN THE PAST 12 MONTHS, THE FOOD YOU BOUGHT JUST DIDN'T LAST AND YOU DIDN'T HAVE MONEY TO GET MORE.: NEVER TRUE

## 2025-04-11 SDOH — ECONOMIC STABILITY: FOOD INSECURITY: WITHIN THE PAST 12 MONTHS, YOU WORRIED THAT YOUR FOOD WOULD RUN OUT BEFORE YOU GOT MONEY TO BUY MORE.: NEVER TRUE

## 2025-04-11 SDOH — ECONOMIC STABILITY: INCOME INSECURITY: IN THE LAST 12 MONTHS, WAS THERE A TIME WHEN YOU WERE NOT ABLE TO PAY THE MORTGAGE OR RENT ON TIME?: PATIENT DECLINED

## 2025-04-11 ASSESSMENT — PATIENT HEALTH QUESTIONNAIRE - PHQ9
SUM OF ALL RESPONSES TO PHQ QUESTIONS 1-9: 2
SUM OF ALL RESPONSES TO PHQ QUESTIONS 1-9: 2
1. LITTLE INTEREST OR PLEASURE IN DOING THINGS: SEVERAL DAYS
SUM OF ALL RESPONSES TO PHQ9 QUESTIONS 1 & 2: 2
2. FEELING DOWN, DEPRESSED OR HOPELESS: SEVERAL DAYS
2. FEELING DOWN, DEPRESSED OR HOPELESS: SEVERAL DAYS
SUM OF ALL RESPONSES TO PHQ QUESTIONS 1-9: 2
1. LITTLE INTEREST OR PLEASURE IN DOING THINGS: SEVERAL DAYS
SUM OF ALL RESPONSES TO PHQ QUESTIONS 1-9: 2

## 2025-04-14 ENCOUNTER — OFFICE VISIT (OUTPATIENT)
Dept: INTERNAL MEDICINE CLINIC | Age: 45
End: 2025-04-14

## 2025-04-14 VITALS
WEIGHT: 150.8 LBS | BODY MASS INDEX: 24.34 KG/M2 | SYSTOLIC BLOOD PRESSURE: 104 MMHG | HEART RATE: 89 BPM | OXYGEN SATURATION: 96 % | DIASTOLIC BLOOD PRESSURE: 80 MMHG

## 2025-04-14 DIAGNOSIS — J45.40 ASTHMA IN ADULT, MODERATE PERSISTENT, UNCOMPLICATED: ICD-10-CM

## 2025-04-14 DIAGNOSIS — M62.830 BACK SPASM: ICD-10-CM

## 2025-04-14 DIAGNOSIS — J45.991 COUGH VARIANT ASTHMA: ICD-10-CM

## 2025-04-14 DIAGNOSIS — S39.012A BACK STRAIN, INITIAL ENCOUNTER: ICD-10-CM

## 2025-04-14 DIAGNOSIS — G89.29 CHRONIC PAIN OF LEFT KNEE: Primary | ICD-10-CM

## 2025-04-14 DIAGNOSIS — M25.562 CHRONIC PAIN OF LEFT KNEE: Primary | ICD-10-CM

## 2025-04-14 RX ORDER — MELOXICAM 15 MG/1
15 TABLET ORAL DAILY PRN
Qty: 30 TABLET | Refills: 2 | Status: SHIPPED | OUTPATIENT
Start: 2025-04-14

## 2025-04-14 RX ORDER — MONTELUKAST SODIUM 10 MG/1
10 TABLET ORAL DAILY
Qty: 90 TABLET | Refills: 3 | Status: SHIPPED | OUTPATIENT
Start: 2025-04-14

## 2025-04-14 RX ORDER — IBUPROFEN 600 MG/1
600 TABLET, FILM COATED ORAL 3 TIMES DAILY PRN
Qty: 90 TABLET | Refills: 3 | Status: CANCELLED | OUTPATIENT
Start: 2025-04-14

## 2025-04-14 ASSESSMENT — ENCOUNTER SYMPTOMS
ABDOMINAL PAIN: 0
TROUBLE SWALLOWING: 0
PHOTOPHOBIA: 0
SHORTNESS OF BREATH: 0
NAUSEA: 0
CHEST TIGHTNESS: 0
WHEEZING: 0
VOICE CHANGE: 0
VOMITING: 0

## 2025-04-14 NOTE — PROGRESS NOTES
Corbin Colmenares  1980  male  44 y.o.    SUBJECTIVE:    Chief Complaint   Patient presents with    6 Month Follow-Up       History of Present Illness  The patient presents for a 6-month follow-up visit.    Asthma symptoms have improved, attributed to a recent change in employment. No nighttime coughing or wheezing. Singulair daily is effective without additional inhaler use. Lifestyle modifications include avoiding cold water consumption.    Knee pain persists, managed with ibuprofen. Meloxicam was previously prescribed and found more effective. Joint injection from Dr. Restrepo in 12/2024 provided some relief. No surgical intervention needed. Muscle relaxer used occasionally, no refill required.         Past Medical History:   Diagnosis Date    Asthma      History reviewed. No pertinent surgical history.  Social History     Socioeconomic History    Marital status:      Spouse name: None    Number of children: 1    Years of education: None    Highest education level: None   Occupational History    Occupation: [employer]     Comment: [employer], 12 hr days.    Tobacco Use    Smoking status: Never    Smokeless tobacco: Never    Tobacco comments:     Never   Vaping Use    Vaping status: Unknown   Substance and Sexual Activity    Alcohol use: Not Currently     Comment: Not anymore since 2021    Drug use: No    Sexual activity: Yes     Partners: Female     Social Drivers of Health     Financial Resource Strain: Low Risk  (10/11/2024)    Overall Financial Resource Strain (CARDIA)     Difficulty of Paying Living Expenses: Not hard at all   Food Insecurity: No Food Insecurity (4/11/2025)    Hunger Vital Sign     Worried About Running Out of Food in the Last Year: Never true     Ran Out of Food in the Last Year: Never true   Transportation Needs: No Transportation Needs (4/11/2025)    PRAPARE - Transportation     Lack of Transportation (Medical): No     Lack of Transportation (Non-Medical): No   Physical

## 2025-05-01 ENCOUNTER — OFFICE VISIT (OUTPATIENT)
Dept: ORTHOPEDIC SURGERY | Age: 45
End: 2025-05-01
Payer: COMMERCIAL

## 2025-05-01 VITALS — BODY MASS INDEX: 24.11 KG/M2 | WEIGHT: 150 LBS | HEIGHT: 66 IN

## 2025-05-01 DIAGNOSIS — M25.561 RIGHT KNEE PAIN, UNSPECIFIED CHRONICITY: ICD-10-CM

## 2025-05-01 DIAGNOSIS — M17.12 PRIMARY OSTEOARTHRITIS OF LEFT KNEE: Primary | ICD-10-CM

## 2025-05-01 PROCEDURE — 20610 DRAIN/INJ JOINT/BURSA W/O US: CPT | Performed by: ORTHOPAEDIC SURGERY

## 2025-05-01 RX ORDER — TRIAMCINOLONE ACETONIDE 40 MG/ML
40 INJECTION, SUSPENSION INTRA-ARTICULAR; INTRAMUSCULAR ONCE
Status: COMPLETED | OUTPATIENT
Start: 2025-05-01 | End: 2025-05-01

## 2025-05-01 RX ORDER — LIDOCAINE HYDROCHLORIDE 10 MG/ML
4 INJECTION, SOLUTION INFILTRATION; PERINEURAL ONCE
Status: COMPLETED | OUTPATIENT
Start: 2025-05-01 | End: 2025-05-01

## 2025-05-01 RX ADMIN — LIDOCAINE HYDROCHLORIDE 4 ML: 10 INJECTION, SOLUTION INFILTRATION; PERINEURAL at 14:35

## 2025-05-01 RX ADMIN — LIDOCAINE HYDROCHLORIDE 4 ML: 10 INJECTION, SOLUTION INFILTRATION; PERINEURAL at 14:44

## 2025-05-01 RX ADMIN — TRIAMCINOLONE ACETONIDE 40 MG: 40 INJECTION, SUSPENSION INTRA-ARTICULAR; INTRAMUSCULAR at 14:44

## 2025-05-01 RX ADMIN — TRIAMCINOLONE ACETONIDE 40 MG: 40 INJECTION, SUSPENSION INTRA-ARTICULAR; INTRAMUSCULAR at 14:36

## 2025-05-01 NOTE — PROGRESS NOTES
Joint Injection: risks and benefits were discussed with the patient, after preparation of the injection site with alcohol, a combination of 1cc kenelog and 4cc marcaine totaling 5 cc was injected into the LEFT KNEE joint for arthriits. The procedure was tolerated well without adverse reaction. The patient was counseled on potential reactions to the injection and given information on follow up and when to seek medical attention. The patient will monitor how long relief persists.    Will get him approved for euflexxa injection

## 2025-05-02 ENCOUNTER — TELEPHONE (OUTPATIENT)
Dept: ORTHOPEDIC SURGERY | Age: 45
End: 2025-05-02

## 2025-05-22 ENCOUNTER — OFFICE VISIT (OUTPATIENT)
Dept: ORTHOPEDIC SURGERY | Age: 45
End: 2025-05-22

## 2025-05-22 VITALS — BODY MASS INDEX: 24.11 KG/M2 | HEIGHT: 66 IN | WEIGHT: 150 LBS

## 2025-05-22 DIAGNOSIS — M17.12 PRIMARY OSTEOARTHRITIS OF LEFT KNEE: Primary | ICD-10-CM

## 2025-05-29 ENCOUNTER — OFFICE VISIT (OUTPATIENT)
Dept: ORTHOPEDIC SURGERY | Age: 45
End: 2025-05-29

## 2025-05-29 DIAGNOSIS — M17.12 PRIMARY OSTEOARTHRITIS OF LEFT KNEE: Primary | ICD-10-CM

## 2025-05-29 NOTE — PROGRESS NOTES
Joint Injection: risks and benefits were discussed with the patient, after preparation of the injection site with alcohol, HYALURONIC ACID (Euflexxa 20mg) was injected into the LEFT KNEE joint for arthritis. The procedure was tolerated well without adverse reaction. The patient was counseled on potential reactions to the injection and given information on follow up and when to seek medical attention.     The patient will return in one week for their next injection.     2/3    Will SINAN Choe

## 2025-06-05 ENCOUNTER — OFFICE VISIT (OUTPATIENT)
Dept: ORTHOPEDIC SURGERY | Age: 45
End: 2025-06-05

## 2025-06-05 VITALS — WEIGHT: 150 LBS | BODY MASS INDEX: 24.11 KG/M2 | HEIGHT: 66 IN

## 2025-06-05 DIAGNOSIS — M17.12 PRIMARY OSTEOARTHRITIS OF LEFT KNEE: Primary | ICD-10-CM

## 2025-06-05 RX ORDER — METHYLPREDNISOLONE 4 MG/1
TABLET ORAL
Qty: 1 KIT | Refills: 0 | Status: SHIPPED | OUTPATIENT
Start: 2025-06-05 | End: 2025-06-11

## 2025-06-05 NOTE — PROGRESS NOTES
Joint Injection: risks and benefits were discussed with the patient, after preparation of the injection site with alcohol, HYALURONIC ACID (Euflexxa 20mg) was injected into the LEFT KNEE joint for arthritis. The procedure was tolerated well without adverse reaction. The patient was counseled on potential reactions to the injection and given information on follow up and when to seek medical attention. The patient will monitor how long relief persists.    I discussed with the patient that we can perform these every 6 months he will schedule a follow-up and call prior to that appointment so that we can reorder the medication at that time.    3/3    Will SINAN Choe

## 2025-07-18 DIAGNOSIS — J45.991 COUGH VARIANT ASTHMA: ICD-10-CM

## 2025-07-18 DIAGNOSIS — J45.40 ASTHMA IN ADULT, MODERATE PERSISTENT, UNCOMPLICATED: ICD-10-CM

## 2025-07-18 RX ORDER — MONTELUKAST SODIUM 10 MG/1
10 TABLET ORAL DAILY
Qty: 90 TABLET | Refills: 1 | Status: SHIPPED | OUTPATIENT
Start: 2025-07-18

## 2025-07-26 DIAGNOSIS — M62.830 BACK SPASM: ICD-10-CM

## 2025-07-26 DIAGNOSIS — M25.562 CHRONIC PAIN OF LEFT KNEE: ICD-10-CM

## 2025-07-26 DIAGNOSIS — G89.29 CHRONIC PAIN OF LEFT KNEE: ICD-10-CM

## 2025-07-26 DIAGNOSIS — S39.012A BACK STRAIN, INITIAL ENCOUNTER: ICD-10-CM

## 2025-07-28 RX ORDER — MELOXICAM 15 MG/1
15 TABLET ORAL DAILY PRN
Qty: 30 TABLET | Refills: 2 | Status: SHIPPED | OUTPATIENT
Start: 2025-07-28